# Patient Record
Sex: FEMALE | Race: WHITE | NOT HISPANIC OR LATINO | Employment: OTHER | URBAN - NONMETROPOLITAN AREA
[De-identification: names, ages, dates, MRNs, and addresses within clinical notes are randomized per-mention and may not be internally consistent; named-entity substitution may affect disease eponyms.]

---

## 2021-10-11 PROBLEM — Z86.0101 HISTORY OF ADENOMATOUS POLYP OF COLON: Status: ACTIVE | Noted: 2021-10-11

## 2024-01-09 ENCOUNTER — HOSPITAL ENCOUNTER (OUTPATIENT)
Dept: CARDIOLOGY | Facility: HOSPITAL | Age: 79
Discharge: HOME | End: 2024-01-09
Payer: MEDICARE

## 2024-01-09 DIAGNOSIS — R00.2 PALPITATIONS: ICD-10-CM

## 2024-01-09 DIAGNOSIS — R06.09 OTHER FORMS OF DYSPNEA: ICD-10-CM

## 2024-01-09 PROCEDURE — 93246 EXT ECG>7D<15D RECORDING: CPT

## 2024-01-09 PROCEDURE — 93306 TTE W/DOPPLER COMPLETE: CPT

## 2024-01-11 LAB
BH CV ECHO MEAS - AO MAX PG: 6.2 MMHG
BH CV ECHO MEAS - AO MEAN PG: 4 MMHG
BH CV ECHO MEAS - AO ROOT DIAM: 2.7 CM
BH CV ECHO MEAS - AO V2 MAX: 124 CM/SEC
BH CV ECHO MEAS - AO V2 VTI: 29.5 CM
BH CV ECHO MEAS - EDV(CUBED): 32.8 ML
BH CV ECHO MEAS - EDV(MOD-SP2): 46 ML
BH CV ECHO MEAS - EDV(MOD-SP4): 67 ML
BH CV ECHO MEAS - EF(MOD-BP): 59 %
BH CV ECHO MEAS - EF(MOD-SP2): 60.9 %
BH CV ECHO MEAS - EF(MOD-SP4): 55.2 %
BH CV ECHO MEAS - ESV(CUBED): 8 ML
BH CV ECHO MEAS - ESV(MOD-SP2): 18 ML
BH CV ECHO MEAS - ESV(MOD-SP4): 30 ML
BH CV ECHO MEAS - FS: 37.5 %
BH CV ECHO MEAS - IVS/LVPW: 1 CM
BH CV ECHO MEAS - IVSD: 1.1 CM
BH CV ECHO MEAS - LA DIMENSION: 4.5 CM
BH CV ECHO MEAS - LV DIASTOLIC VOL/BSA (35-75): 34.1 CM2
BH CV ECHO MEAS - LV MASS(C)D: 104.3 GRAMS
BH CV ECHO MEAS - LV SYSTOLIC VOL/BSA (12-30): 15.3 CM2
BH CV ECHO MEAS - LVIDD: 3.2 CM
BH CV ECHO MEAS - LVIDS: 2 CM
BH CV ECHO MEAS - LVOT AREA: 2.01 CM2
BH CV ECHO MEAS - LVOT DIAM: 1.6 CM
BH CV ECHO MEAS - LVPWD: 1.1 CM
BH CV ECHO MEAS - MR MAX PG: 67.9 MMHG
BH CV ECHO MEAS - MR MAX VEL: 412 CM/SEC
BH CV ECHO MEAS - MV A MAX VEL: 121 CM/SEC
BH CV ECHO MEAS - MV DEC TIME: 0.14 SEC
BH CV ECHO MEAS - MV E MAX VEL: 112 CM/SEC
BH CV ECHO MEAS - MV E/A: 0.93
BH CV ECHO MEAS - PI END-D VEL: 177 CM/SEC
BH CV ECHO MEAS - SI(MOD-SP2): 14.3 ML/M2
BH CV ECHO MEAS - SI(MOD-SP4): 18.9 ML/M2
BH CV ECHO MEAS - SV(MOD-SP2): 28 ML
BH CV ECHO MEAS - SV(MOD-SP4): 37 ML
BH CV ECHO MEAS - TR MAX PG: 18.7 MMHG
BH CV ECHO MEAS - TR MAX VEL: 216 CM/SEC
LEFT ATRIUM VOLUME INDEX: 29.6 ML/M2
LEFT ATRIUM VOLUME: 58 ML

## 2024-05-14 ENCOUNTER — OFFICE VISIT (OUTPATIENT)
Dept: CARDIOLOGY | Facility: CLINIC | Age: 79
End: 2024-05-14
Payer: MEDICARE

## 2024-05-14 VITALS
HEIGHT: 63 IN | DIASTOLIC BLOOD PRESSURE: 80 MMHG | BODY MASS INDEX: 35.26 KG/M2 | WEIGHT: 199 LBS | SYSTOLIC BLOOD PRESSURE: 138 MMHG | HEART RATE: 81 BPM | OXYGEN SATURATION: 94 %

## 2024-05-14 DIAGNOSIS — R00.2 PALPITATIONS: Primary | ICD-10-CM

## 2024-05-14 DIAGNOSIS — T45.515A WARFARIN-INDUCED COAGULOPATHY: ICD-10-CM

## 2024-05-14 DIAGNOSIS — Z79.01 CURRENT USE OF LONG TERM ANTICOAGULATION: ICD-10-CM

## 2024-05-14 DIAGNOSIS — I48.0 PAROXYSMAL ATRIAL FIBRILLATION: Primary | ICD-10-CM

## 2024-05-14 DIAGNOSIS — D68.32 WARFARIN-INDUCED COAGULOPATHY: ICD-10-CM

## 2024-05-14 RX ORDER — WARFARIN SODIUM 5 MG/1
5 TABLET ORAL
Qty: 60 TABLET | Refills: 11 | Status: SHIPPED | OUTPATIENT
Start: 2024-05-14

## 2024-05-14 NOTE — PATIENT INSTRUCTIONS
Repeat a holter monitor for 2 weeks  Start warfarin and schedule follow up in the anticoagulation clinic  You will need a lab draw in 1 week  Take both the eliquis and warfarin together for 7 days then STOP eliquis  No additional medication changes for now

## 2024-05-15 NOTE — PROGRESS NOTES
"EP NEW PATIENT VISIT    Chief Complaint  Rapid Heart Rate (/NEW PATIENT )    Subjective        History of Present Illness    EP Problems:  1.  Palpitations  2.  ?PAF    Cardiology Problems:  1.  Hypertension  2.  Hyperlipidemia  3.  DVT    Medical Problems:  1.  GERD  2.  Obesity  3.  CKD  4.  Type 2 diabetes  5.  Carcinoid tumor      Kiesha Nichols is a 78 y.o. female with problem list as above who presents to the clinic for evaluation of palpitations.  She has a questionable history of previous atrial fibrillation as documented on outside hospital note.  It is unclear whether this diagnosis was ever confirmed or just suspected.  She has episodic palpitations that can occur once every several weeks.  She wore a Holter monitor which revealed episodes of paroxysmal SVT but no sustained arrhythmias.  She was previously anticoagulated for history of DVT with apixaban.  She states that when her episodes of palpitations occur, they can last for several hours to even days in duration with variably fluctuating heart rates.  She feels tired and short of breath when she has these episodes.    Objective   Vital Signs:  /80 (BP Location: Right arm, Patient Position: Sitting)   Pulse 81   Ht 160 cm (63\")   Wt 90.3 kg (199 lb)   SpO2 94%   BMI 35.25 kg/m²   Estimated body mass index is 35.25 kg/m² as calculated from the following:    Height as of this encounter: 160 cm (63\").    Weight as of this encounter: 90.3 kg (199 lb).      Physical Exam  Vitals reviewed.   Constitutional:       Appearance: She is obese.   Cardiovascular:      Rate and Rhythm: Normal rate and regular rhythm.      Pulses: Normal pulses.      Heart sounds: Normal heart sounds. No murmur heard.  Pulmonary:      Effort: Pulmonary effort is normal. No respiratory distress.      Breath sounds: Normal breath sounds.   Skin:     General: Skin is warm and dry.   Neurological:      General: No focal deficit present.      Mental Status: She is alert and " oriented to person, place, and time.   Psychiatric:         Mood and Affect: Mood normal.         Judgment: Judgment normal.        Result Review :  The following data was reviewed by: Koki Hanson MD on 05/14/2024:    Previous Holter monitor from 1/9/2024 directly visualized) reviewed: Predominantly sinus rhythm, paroxysmal episodes of SVT.  No evidence of significant atrial fibrillation or atrial flutter.          [id number] SCORE   [id number] Score: 5 (5/15/2024  1:06 AM)                 Assessment and Plan   Diagnoses and all orders for this visit:    1. Palpitations (Primary)  -     Holter Monitor - 72 Hour Up To 15 Days; Future    2. Warfarin-induced coagulopathy  -     Cancel: Protime-INR; Future    Other orders  -     warfarin (COUMADIN) 5 MG tablet; Take 1 tablet by mouth Daily.  Dispense: 60 tablet; Refill: 11        Kiesha Nichols is a 78 y.o. female with problem list as above who presents to the clinic for evaluation of palpitations, questionable paroxysmal atrial fibrillation, previous DVT.  In regards to her symptomatic episodes, they sound most suspicious for paroxysmal atrial fibrillation.  I suspect that this is her most likely diagnosis.  Given that we have not captured 1 of these episodes on Holter monitoring, we will repeat a 2-week Holter monitor today.    She is already on anticoagulation but unable to afford apixaban.  Will plan to transition her to warfarin for her history of DVT as well as possible atrial fibrillation.  She will use apixaban as a bridge during the initiation.    She would benefit from weight loss and lifestyle risk factor modification for prevention of recurrent atrial fibrillation.    Plan:  -Repeat a 2-week Holter monitor to try to capture her symptomatic episodes  -Stop apixaban and start warfarin for anticoagulation given inability to afford apixaban  -Diet, weight loss, exercise for prevention of atrial fibrillation episodes  -Can consider additional interventions  for atrial fibrillation once arrhythmias are documented             Follow Up   Return in about 3 months (around 8/14/2024).  Patient was given instructions and counseling regarding her condition or for health maintenance advice. Please see specific information pulled into the AVS if appropriate.     Part of this note may be an electronic transcription/translation of spoken language to printed text using the Dragon Dictation System.

## 2024-05-16 ENCOUNTER — ANTICOAGULATION VISIT (OUTPATIENT)
Dept: CARDIOLOGY | Facility: CLINIC | Age: 79
End: 2024-05-16
Payer: MEDICARE

## 2024-05-16 DIAGNOSIS — Z79.01 CURRENT USE OF LONG TERM ANTICOAGULATION: ICD-10-CM

## 2024-05-16 DIAGNOSIS — I48.0 PAROXYSMAL ATRIAL FIBRILLATION: Primary | ICD-10-CM

## 2024-05-17 ENCOUNTER — ANTICOAGULATION VISIT (OUTPATIENT)
Dept: CARDIOLOGY | Facility: CLINIC | Age: 79
End: 2024-05-17
Payer: MEDICARE

## 2024-05-17 DIAGNOSIS — Z79.01 CURRENT USE OF LONG TERM ANTICOAGULATION: ICD-10-CM

## 2024-05-17 DIAGNOSIS — I48.0 PAROXYSMAL ATRIAL FIBRILLATION: Primary | ICD-10-CM

## 2024-05-21 LAB — INR PPP: 3.8

## 2024-05-22 ENCOUNTER — ANTICOAGULATION VISIT (OUTPATIENT)
Dept: CARDIOLOGY | Facility: CLINIC | Age: 79
End: 2024-05-22
Payer: MEDICARE

## 2024-05-22 DIAGNOSIS — I48.0 PAROXYSMAL ATRIAL FIBRILLATION: Primary | ICD-10-CM

## 2024-05-22 DIAGNOSIS — Z79.01 CURRENT USE OF LONG TERM ANTICOAGULATION: ICD-10-CM

## 2024-05-28 ENCOUNTER — TELEPHONE (OUTPATIENT)
Dept: CARDIOLOGY | Facility: CLINIC | Age: 79
End: 2024-05-28
Payer: MEDICARE

## 2024-05-28 ENCOUNTER — ANTICOAGULATION VISIT (OUTPATIENT)
Dept: CARDIOLOGY | Facility: CLINIC | Age: 79
End: 2024-05-28
Payer: MEDICARE

## 2024-05-28 DIAGNOSIS — I48.0 PAROXYSMAL ATRIAL FIBRILLATION: Primary | ICD-10-CM

## 2024-05-28 DIAGNOSIS — Z79.01 CURRENT USE OF LONG TERM ANTICOAGULATION: ICD-10-CM

## 2024-05-28 LAB — INR PPP: 4.8

## 2024-05-28 NOTE — PROGRESS NOTES
Messaged received from clinic office for critical INR that was not called or transferred to the Coumadin clinic. RN placed call to [facility] outpatient lab, spoke with Lynnette who apologized and has faxed the results to coumadin clinic. She will notify staff that INR results are to be called to the Coumadin clinic. Unable to reach patient for instructions or leave a voicemail as VM is not set up. No one is listed on Verbal Release of Information or emergency contacts

## 2024-06-03 ENCOUNTER — TELEPHONE (OUTPATIENT)
Dept: CARDIOLOGY | Facility: CLINIC | Age: 79
End: 2024-06-03
Payer: MEDICARE

## 2024-06-03 ENCOUNTER — ANTICOAGULATION VISIT (OUTPATIENT)
Dept: CARDIOLOGY | Facility: CLINIC | Age: 79
End: 2024-06-03
Payer: MEDICARE

## 2024-06-03 DIAGNOSIS — I48.0 PAROXYSMAL ATRIAL FIBRILLATION: Primary | ICD-10-CM

## 2024-06-03 DIAGNOSIS — Z79.01 CURRENT USE OF LONG TERM ANTICOAGULATION: ICD-10-CM

## 2024-06-03 LAB — INR PPP: 1.6

## 2024-06-10 ENCOUNTER — TELEPHONE (OUTPATIENT)
Dept: CARDIOLOGY | Facility: CLINIC | Age: 79
End: 2024-06-10
Payer: MEDICARE

## 2024-06-10 ENCOUNTER — ANTICOAGULATION VISIT (OUTPATIENT)
Dept: CARDIOLOGY | Facility: CLINIC | Age: 79
End: 2024-06-10
Payer: MEDICARE

## 2024-06-10 DIAGNOSIS — I48.0 PAROXYSMAL ATRIAL FIBRILLATION: Primary | ICD-10-CM

## 2024-06-10 DIAGNOSIS — Z79.01 CURRENT USE OF LONG TERM ANTICOAGULATION: ICD-10-CM

## 2024-06-10 NOTE — TELEPHONE ENCOUNTER
Koki Hanson MD Cooper, Shelby R, MA  Please let her know that her monitor did in fact confirm atrial fibrillation as we suspected in clinic.  She should continue on her blood thinners.  If she is okay with it, we can start her on flecainide to try to suppress some of these episodes.  Long-term, ablation may be reasonable and we can discuss this at her next clinic visit.  She should let us know if she has any prolonged episodes with this or if her symptoms get worse.      Patient spouse Ameya notified, stating he would like to wait until her follow up appt before starting on any medications. He states she has been feeling better. He will call if she begins to have symptoms or her symptoms worsen.

## 2024-06-11 LAB — INR PPP: 4.6

## 2024-06-13 ENCOUNTER — ANTICOAGULATION VISIT (OUTPATIENT)
Dept: CARDIOLOGY | Facility: CLINIC | Age: 79
End: 2024-06-13
Payer: MEDICARE

## 2024-06-13 DIAGNOSIS — I48.0 PAROXYSMAL ATRIAL FIBRILLATION: Primary | ICD-10-CM

## 2024-06-13 DIAGNOSIS — Z79.01 CURRENT USE OF LONG TERM ANTICOAGULATION: ICD-10-CM

## 2024-06-13 LAB — INR PPP: 2.8

## 2024-06-18 ENCOUNTER — ANTICOAGULATION VISIT (OUTPATIENT)
Dept: CARDIOLOGY | Facility: CLINIC | Age: 79
End: 2024-06-18
Payer: MEDICARE

## 2024-06-18 DIAGNOSIS — I48.0 PAROXYSMAL ATRIAL FIBRILLATION: Primary | ICD-10-CM

## 2024-06-18 DIAGNOSIS — Z79.01 CURRENT USE OF LONG TERM ANTICOAGULATION: ICD-10-CM

## 2024-06-18 LAB — INR PPP: 1.8

## 2024-06-27 ENCOUNTER — ANTICOAGULATION VISIT (OUTPATIENT)
Dept: CARDIOLOGY | Facility: CLINIC | Age: 79
End: 2024-06-27
Payer: MEDICARE

## 2024-06-27 DIAGNOSIS — I48.0 PAROXYSMAL ATRIAL FIBRILLATION: Primary | ICD-10-CM

## 2024-06-27 DIAGNOSIS — Z79.01 CURRENT USE OF LONG TERM ANTICOAGULATION: ICD-10-CM

## 2024-06-27 LAB — INR PPP: 2.1

## 2024-07-11 ENCOUNTER — ANTICOAGULATION VISIT (OUTPATIENT)
Dept: CARDIOLOGY | Facility: CLINIC | Age: 79
End: 2024-07-11
Payer: MEDICARE

## 2024-07-11 DIAGNOSIS — I48.0 PAROXYSMAL ATRIAL FIBRILLATION: Primary | ICD-10-CM

## 2024-07-11 DIAGNOSIS — Z79.01 CURRENT USE OF LONG TERM ANTICOAGULATION: ICD-10-CM

## 2024-07-11 LAB — INR PPP: 1.8

## 2024-07-25 ENCOUNTER — ANTICOAGULATION VISIT (OUTPATIENT)
Dept: CARDIOLOGY | Facility: CLINIC | Age: 79
End: 2024-07-25
Payer: MEDICARE

## 2024-07-25 DIAGNOSIS — Z79.01 CURRENT USE OF LONG TERM ANTICOAGULATION: ICD-10-CM

## 2024-07-25 DIAGNOSIS — I48.0 PAROXYSMAL ATRIAL FIBRILLATION: Primary | ICD-10-CM

## 2024-07-25 LAB — INR PPP: 1.9

## 2024-08-01 ENCOUNTER — ANTICOAGULATION VISIT (OUTPATIENT)
Dept: CARDIOLOGY | Facility: CLINIC | Age: 79
End: 2024-08-01
Payer: MEDICARE

## 2024-08-01 DIAGNOSIS — Z79.01 CURRENT USE OF LONG TERM ANTICOAGULATION: ICD-10-CM

## 2024-08-01 DIAGNOSIS — I48.0 PAROXYSMAL ATRIAL FIBRILLATION: Primary | ICD-10-CM

## 2024-08-01 LAB — INR PPP: 2.1

## 2024-08-15 ENCOUNTER — ANTICOAGULATION VISIT (OUTPATIENT)
Dept: CARDIOLOGY | Facility: CLINIC | Age: 79
End: 2024-08-15
Payer: MEDICARE

## 2024-08-15 DIAGNOSIS — I48.0 PAROXYSMAL ATRIAL FIBRILLATION: Primary | ICD-10-CM

## 2024-08-15 DIAGNOSIS — Z79.01 CURRENT USE OF LONG TERM ANTICOAGULATION: ICD-10-CM

## 2024-08-15 LAB — INR PPP: 4.7

## 2024-08-16 ENCOUNTER — ANTICOAGULATION VISIT (OUTPATIENT)
Dept: CARDIOLOGY | Facility: CLINIC | Age: 79
End: 2024-08-16
Payer: MEDICARE

## 2024-08-16 ENCOUNTER — OFFICE VISIT (OUTPATIENT)
Dept: CARDIOLOGY | Facility: CLINIC | Age: 79
End: 2024-08-16
Payer: MEDICARE

## 2024-08-16 ENCOUNTER — LAB (OUTPATIENT)
Dept: LAB | Facility: HOSPITAL | Age: 79
End: 2024-08-16
Payer: MEDICARE

## 2024-08-16 VITALS
OXYGEN SATURATION: 99 % | DIASTOLIC BLOOD PRESSURE: 84 MMHG | HEART RATE: 84 BPM | HEIGHT: 63 IN | SYSTOLIC BLOOD PRESSURE: 158 MMHG | BODY MASS INDEX: 33.84 KG/M2 | WEIGHT: 191 LBS

## 2024-08-16 DIAGNOSIS — I48.0 PAROXYSMAL ATRIAL FIBRILLATION: ICD-10-CM

## 2024-08-16 DIAGNOSIS — I48.91 ATRIAL FIBRILLATION, UNSPECIFIED TYPE: Primary | ICD-10-CM

## 2024-08-16 DIAGNOSIS — Z79.01 CURRENT USE OF LONG TERM ANTICOAGULATION: ICD-10-CM

## 2024-08-16 DIAGNOSIS — I48.0 PAROXYSMAL ATRIAL FIBRILLATION: Primary | ICD-10-CM

## 2024-08-16 LAB
INR PPP: 3.49 (ref 0.91–1.09)
PROTHROMBIN TIME: 36.4 SECONDS (ref 11.8–14.8)

## 2024-08-16 PROCEDURE — 36415 COLL VENOUS BLD VENIPUNCTURE: CPT

## 2024-08-16 PROCEDURE — 85610 PROTHROMBIN TIME: CPT

## 2024-08-16 RX ORDER — FLECAINIDE ACETATE 50 MG/1
50 TABLET ORAL 2 TIMES DAILY
Qty: 60 TABLET | Refills: 11 | Status: SHIPPED | OUTPATIENT
Start: 2024-08-16

## 2024-08-16 NOTE — PATIENT INSTRUCTIONS
Start flecainide 50mg twice a day.   Get EKG in one week at the heart center.     Follow up in EP clinic in 1 month

## 2024-08-16 NOTE — PROGRESS NOTES
"[facility] -  CLINIC FOLLOW UP     Patient Care Team:  Sky Haro MD as PCP - General  Sky Haro MD as PCP - Family Medicine  Alessio Hendrix MD as Consulting Physician (Gastroenterology)  Koki Hanson MD as Cardiologist (Cardiac Electrophysiology)    Chief Complaint:follow up to monitor     Subjective   EP Problems:  1.  Palpitations  2.  PAF  -6/7/24: Holter with afib      Cardiology Problems:  1.  Hypertension  2.  Hyperlipidemia  3.  DVT     Medical Problems:  1.  GERD  2.  Obesity  3.  CKD  4.  Type 2 diabetes  5.  Carcinoid tumor    HPI: Today I had the pleasure of seeing Kiesha Nichols in the cardiology clinic for follow up. She is a pleasant 78 year old female with a history of HTN, DM, DVT and palpitations now confirmed to be PAF on recent holter monitoring. She was symptomatic during her episodes with palpitations. EKG today has NSR with QRS duration of 68ms. Her echo has normal LVEF without any structural abnormalities.     She walks every morning and denies any symptoms of angina. She likes to walk on her porch which is 60 ft long. She denies any presyncope or dizziness and is not prone to falls. However, her  is concerned about her recent INRS being low and then too high over 4. She denies any bleeding issues. However, they are interested in the Watchman since they could not afford eliquis or other DOACs and seem to be having issues with Warfarin therapeutics.     Objective     Visit Vitals  /84   Pulse 84   Ht 160 cm (62.99\")   Wt 86.6 kg (191 lb)   SpO2 99%   BMI 33.84 kg/m²           Vitals reviewed.   Constitutional:       Appearance: Not in distress.   Eyes:      Extraocular Movements: Extraocular movements intact.      Conjunctiva/sclera: Conjunctivae normal.      Pupils: Pupils are equal, round, and reactive to light.   HENT:      Head: Normocephalic and atraumatic.      Nose: Nose normal.    Mouth/Throat:      Lips: Pink.      " Mouth: Mucous membranes are moist.      Pharynx: Oropharynx is clear.   Neck:      Vascular: No carotid bruit or JVD. JVD normal.   Pulmonary:      Effort: Pulmonary effort is normal.      Breath sounds: Normal breath sounds.   Chest:      Chest wall: Not tender to palpatation.   Cardiovascular:      PMI at left midclavicular line. Normal rate. Occasional ectopic beats. Regular rhythm. Normal S1. Normal S2.       Murmurs: There is no murmur.      No gallop.  No rub.   Pulses:     Radial: 2+ bilaterally.  Edema:     Peripheral edema absent.   Musculoskeletal:      Extremities: No clubbing present.     Cervical back: Normal range of motion. Skin:     General: Skin is warm and dry.   Neurological:      General: No focal deficit present.      Mental Status: Alert.   Psychiatric:         Attention and Perception: Attention normal.         Mood and Affect: Affect normal.         Speech: Speech normal.         Behavior: Behavior normal.             The following portions of the patient's history were reviewed and updated as appropriate: allergies, current medications, past medical history, past social history, past and problem list.     Review of Systems   Constitutional: Negative.    HENT: Negative.     Eyes: Negative.    Respiratory: Negative.     Cardiovascular:  Positive for palpitations.   Gastrointestinal: Negative.    Endocrine: Negative.    Genitourinary: Negative.    Musculoskeletal: Negative.    Skin: Negative.    Allergic/Immunologic: Negative.    Neurological: Negative.    Hematological: Negative.    Psychiatric/Behavioral: Negative.              ECG 12 Lead    Date/Time: 8/16/2024 10:36 AM  Performed by: Dee Dee Membreno PA    Authorized by: Dee Dee Membreno PA  Comparison: compared with previous ECG from 5/17/2024  Rhythm: sinus rhythm  Ectopy: unifocal PVCs  Rate: normal  BPM: 84    Clinical impression: normal ECG          Holter Monitor - 72 Hour Up To 15 Days (05/14/2024 12:26)           Medication  Review: yes    Current Outpatient Medications:     amLODIPine (NORVASC) 5 MG tablet, Take 1 tablet by mouth Daily., Disp: , Rfl:     calcium carbonate (OS-CLOVER) 600 MG tablet, Take 1 tablet by mouth Daily., Disp: , Rfl:     Cholecalciferol (VITAMIN D3 PO), Take  by mouth., Disp: , Rfl:     Diclofenac Sodium (VOLTAREN) 1 % gel gel, Apply 2 g topically to the appropriate area as directed As Needed., Disp: , Rfl:     esomeprazole (nexIUM) 20 MG capsule, Take 1 capsule by mouth As Needed., Disp: , Rfl:     insulin aspart (novoLOG) 100 UNIT/ML injection, Inject  under the skin into the appropriate area as directed 3 (Three) Times a Day Before Meals., Disp: , Rfl:     Insulin Degludec (TRESIBA FLEXTOUCH) 200 UNIT/ML solution pen-injector pen injection, Inject  under the skin into the appropriate area as directed Daily., Disp: , Rfl:     irbesartan (Avapro) 300 MG tablet, Take 1 tablet by mouth Every Night., Disp: , Rfl:     lidocaine (LIDODERM) 5 %, Place 1 patch on the skin as directed by provider Daily. Remove & Discard patch within 12 hours or as directed by MD, Disp: 30 each, Rfl: 0    losartan (COZAAR) 50 MG tablet, Take 1 tablet by mouth 2 (Two) Times a Day., Disp: , Rfl:     metoprolol tartrate (LOPRESSOR) 50 MG tablet, Take 1 tablet by mouth., Disp: , Rfl:     multivitamin with minerals tablet tablet, Take 1 tablet by mouth Daily., Disp: , Rfl:     NIFEdipine XL (PROCARDIA XL) 60 MG 24 hr tablet, Take 1 tablet by mouth Daily., Disp: , Rfl:     spironolactone (ALDACTONE) 50 MG tablet, Take 1 tablet by mouth Daily., Disp: , Rfl:     triamcinolone (KENALOG) 0.1 % lotion, Apply  topically to the appropriate area as directed 3 (Three) Times a Day., Disp: , Rfl:     warfarin (COUMADIN) 5 MG tablet, Take 1 tablet by mouth Daily., Disp: 60 tablet, Rfl: 11    flecainide (TAMBOCOR) 50 MG tablet, Take 1 tablet by mouth 2 (Two) Times a Day., Disp: 60 tablet, Rfl: 11   Allergies   Allergen Reactions    Augmentin  [Amoxicillin-Pot Clavulanate] Other (See Comments)     unknown    Crestor [Rosuvastatin] Other (See Comments)     unknown    Lipitor [Atorvastatin] Other (See Comments)     unknown    Niaspan [Niacin] Diarrhea    Prednisone Other (See Comments)     All oral steroids    Sulfa Antibiotics Other (See Comments)     unknown    Zocor [Simvastatin] Other (See Comments)     unknown       I have reviewed       Lab Results   Component Value Date    GLUCOSE 97 04/29/2021    CALCIUM 9.7 04/29/2021     04/29/2021    K 4.7 04/29/2021    CO2 25.6 04/29/2021     04/29/2021    BUN 30 (H) 04/29/2021    CREATININE 1.34 (H) 04/29/2021    BCR 22.4 04/29/2021    ANIONGAP 10.4 04/29/2021         Results for orders placed during the hospital encounter of 01/09/24    Adult Transthoracic Echo Complete W/ Cont if Necessary Per Protocol    Interpretation Summary    Left ventricular systolic function is normal. Left ventricular ejection fraction appears to be 56 - 60%.    Left ventricular diastolic dysfunction is noted.    Normal right ventricular cavity size and systolic function noted.    Mild mitral annular calcification is present. Mild mitral valve regurgitation is present.    No evidence of pulmonary hypertension is present.     Assessment:   Diagnoses and all orders for this visit:    1. Atrial fibrillation, unspecified type (Primary)  -     ECG 12 Lead; Future  -     flecainide (TAMBOCOR) 50 MG tablet; Take 1 tablet by mouth 2 (Two) Times a Day.  Dispense: 60 tablet; Refill: 11  -     ECG 12 Lead; Future    2. Paroxysmal atrial fibrillation    3. Current use of long term anticoagulation    Other orders  -     ECG 12 Lead    PAF: Reviewed holter with patient and . Currently she is symptomatic with palpitations and discussed that it's reasonable to try Flecainide given her normal echo and no symptoms of angina.   -QRS today on EKG is 68ms. Will start Flecainide 50mg BID   -Obtain EKG in 3-5 days   -Follow up in 1 month      Anticoagulation: Currently on warfarin due to financial strain with DOACs. However, her INRs have been a bit labile. Discussed Nolberto briefly.     I spent 30 minutes caring for Kiesha on this date of service. This time includes time spent by me in the following activities:preparing for the visit, reviewing tests, obtaining and/or reviewing a separately obtained history, performing a medically appropriate examination and/or evaluation , counseling and educating the patient/family/caregiver, ordering medications, tests, or procedures, referring and communicating with other health care professionals , documenting information in the medical record, and independently interpreting results and communicating that information with the patient/family/caregiver        Electronically signed by MARY Galvin

## 2024-08-22 ENCOUNTER — TELEPHONE (OUTPATIENT)
Dept: CARDIOLOGY | Facility: CLINIC | Age: 79
End: 2024-08-22
Payer: MEDICARE

## 2024-08-22 ENCOUNTER — ANTICOAGULATION VISIT (OUTPATIENT)
Dept: CARDIOLOGY | Facility: CLINIC | Age: 79
End: 2024-08-22
Payer: MEDICARE

## 2024-08-22 DIAGNOSIS — Z79.01 CURRENT USE OF LONG TERM ANTICOAGULATION: ICD-10-CM

## 2024-08-22 DIAGNOSIS — I48.0 PAROXYSMAL ATRIAL FIBRILLATION: Primary | ICD-10-CM

## 2024-08-22 LAB — INR PPP: 3.7

## 2024-08-22 NOTE — TELEPHONE ENCOUNTER
Caller: JACOB BAEZA    Relationship: Emergency Contact    Best call back number: [phone]     Caller requesting test results: JACOB BAEZA    What test was performed: INR    When was the test performed: 08.16.24

## 2024-08-23 ENCOUNTER — HOSPITAL ENCOUNTER (OUTPATIENT)
Dept: CARDIOLOGY | Facility: HOSPITAL | Age: 79
Discharge: HOME | End: 2024-08-23
Payer: MEDICARE

## 2024-08-23 DIAGNOSIS — I48.91 ATRIAL FIBRILLATION, UNSPECIFIED TYPE: ICD-10-CM

## 2024-08-23 LAB
QT INTERVAL: 400 MS
QTC INTERVAL: 452 MS

## 2024-08-23 PROCEDURE — 93005 ELECTROCARDIOGRAM TRACING: CPT | Performed by: PHYSICIAN ASSISTANT

## 2024-08-23 NOTE — TELEPHONE ENCOUNTER
Contacted Mr. Nichols on Thursday, 8/22, with INR result for patient and instructions related to dosing and repeat INR testing.  He verbalized understanding.    HV

## 2024-08-29 ENCOUNTER — ANTICOAGULATION VISIT (OUTPATIENT)
Dept: CARDIOLOGY | Facility: CLINIC | Age: 79
End: 2024-08-29
Payer: MEDICARE

## 2024-08-29 DIAGNOSIS — I48.0 PAROXYSMAL ATRIAL FIBRILLATION: Primary | ICD-10-CM

## 2024-08-29 DIAGNOSIS — Z79.01 CURRENT USE OF LONG TERM ANTICOAGULATION: ICD-10-CM

## 2024-08-29 LAB
INR PPP: 2.5
QT INTERVAL: 400 MS
QTC INTERVAL: 452 MS

## 2024-09-05 ENCOUNTER — ANTICOAGULATION VISIT (OUTPATIENT)
Dept: CARDIOLOGY | Facility: CLINIC | Age: 79
End: 2024-09-05
Payer: MEDICARE

## 2024-09-05 DIAGNOSIS — I48.0 PAROXYSMAL ATRIAL FIBRILLATION: Primary | ICD-10-CM

## 2024-09-05 DIAGNOSIS — Z79.01 CURRENT USE OF LONG TERM ANTICOAGULATION: ICD-10-CM

## 2024-09-05 LAB — INR PPP: 2.7

## 2024-09-19 ENCOUNTER — ANTICOAGULATION VISIT (OUTPATIENT)
Dept: CARDIOLOGY | Facility: CLINIC | Age: 79
End: 2024-09-19
Payer: MEDICARE

## 2024-09-19 DIAGNOSIS — Z79.01 CURRENT USE OF LONG TERM ANTICOAGULATION: ICD-10-CM

## 2024-09-19 DIAGNOSIS — I48.0 PAROXYSMAL ATRIAL FIBRILLATION: Primary | ICD-10-CM

## 2024-09-19 LAB — INR PPP: 2.7

## 2024-09-27 ENCOUNTER — LAB (OUTPATIENT)
Dept: LAB | Facility: HOSPITAL | Age: 79
End: 2024-09-27
Payer: MEDICARE

## 2024-09-27 ENCOUNTER — OFFICE VISIT (OUTPATIENT)
Dept: CARDIOLOGY | Facility: CLINIC | Age: 79
End: 2024-09-27
Payer: MEDICARE

## 2024-09-27 VITALS
HEART RATE: 75 BPM | BODY MASS INDEX: 33.84 KG/M2 | DIASTOLIC BLOOD PRESSURE: 78 MMHG | HEIGHT: 63 IN | SYSTOLIC BLOOD PRESSURE: 146 MMHG | WEIGHT: 191 LBS | OXYGEN SATURATION: 98 %

## 2024-09-27 DIAGNOSIS — I48.0 PAROXYSMAL ATRIAL FIBRILLATION: Primary | ICD-10-CM

## 2024-09-27 DIAGNOSIS — I48.0 PAROXYSMAL ATRIAL FIBRILLATION: ICD-10-CM

## 2024-09-27 LAB
ANION GAP SERPL CALCULATED.3IONS-SCNC: 10 MMOL/L (ref 5–15)
BUN SERPL-MCNC: 28 MG/DL (ref 8–23)
BUN/CREAT SERPL: 21.2 (ref 7–25)
CALCIUM SPEC-SCNC: 9.1 MG/DL (ref 8.6–10.5)
CHLORIDE SERPL-SCNC: 107 MMOL/L (ref 98–107)
CO2 SERPL-SCNC: 27 MMOL/L (ref 22–29)
CREAT SERPL-MCNC: 1.32 MG/DL (ref 0.57–1)
EGFRCR SERPLBLD CKD-EPI 2021: 40.9 ML/MIN/1.73
GLUCOSE SERPL-MCNC: 195 MG/DL (ref 65–99)
POTASSIUM SERPL-SCNC: 4.4 MMOL/L (ref 3.5–5.2)
SODIUM SERPL-SCNC: 144 MMOL/L (ref 136–145)

## 2024-09-27 PROCEDURE — 80048 BASIC METABOLIC PNL TOTAL CA: CPT

## 2024-09-27 PROCEDURE — 93000 ELECTROCARDIOGRAM COMPLETE: CPT | Performed by: PHYSICIAN ASSISTANT

## 2024-09-27 PROCEDURE — 36415 COLL VENOUS BLD VENIPUNCTURE: CPT

## 2024-09-27 PROCEDURE — 99214 OFFICE O/P EST MOD 30 MIN: CPT | Performed by: PHYSICIAN ASSISTANT

## 2024-10-17 ENCOUNTER — ANTICOAGULATION VISIT (OUTPATIENT)
Dept: CARDIOLOGY | Facility: CLINIC | Age: 79
End: 2024-10-17
Payer: MEDICARE

## 2024-10-17 DIAGNOSIS — Z79.01 CURRENT USE OF LONG TERM ANTICOAGULATION: ICD-10-CM

## 2024-10-17 DIAGNOSIS — I48.0 PAROXYSMAL ATRIAL FIBRILLATION: Primary | ICD-10-CM

## 2024-10-17 LAB — INR PPP: 2.5

## 2024-10-21 ENCOUNTER — APPOINTMENT (OUTPATIENT)
Dept: GENERAL RADIOLOGY | Facility: HOSPITAL | Age: 79
End: 2024-10-21
Payer: MEDICARE

## 2024-10-21 ENCOUNTER — HOSPITAL ENCOUNTER (INPATIENT)
Facility: HOSPITAL | Age: 79
LOS: 1 days | Discharge: HOME | End: 2024-10-22
Attending: FAMILY MEDICINE | Admitting: FAMILY MEDICINE
Payer: MEDICARE

## 2024-10-21 DIAGNOSIS — I48.91 ATRIAL FIBRILLATION WITH RVR: Primary | ICD-10-CM

## 2024-10-21 DIAGNOSIS — I48.91 ATRIAL FIBRILLATION, UNSPECIFIED TYPE: ICD-10-CM

## 2024-10-21 DIAGNOSIS — R06.02 SHORTNESS OF BREATH: ICD-10-CM

## 2024-10-21 PROBLEM — E11.9 TYPE 2 DIABETES MELLITUS: Status: ACTIVE | Noted: 2024-10-21

## 2024-10-21 PROBLEM — R79.1 SUBTHERAPEUTIC INTERNATIONAL NORMALIZED RATIO (INR): Status: ACTIVE | Noted: 2024-10-21

## 2024-10-21 PROBLEM — E66.811 CLASS 1 OBESITY WITH BODY MASS INDEX (BMI) OF 32.0 TO 32.9 IN ADULT: Status: ACTIVE | Noted: 2024-10-21

## 2024-10-21 PROBLEM — Z79.01 CHRONIC ANTICOAGULATION: Status: ACTIVE | Noted: 2024-05-14

## 2024-10-21 LAB
ANION GAP SERPL CALCULATED.3IONS-SCNC: 10 MMOL/L (ref 5–15)
APTT PPP: 39.9 SECONDS (ref 24.5–36)
BASOPHILS # BLD AUTO: 0.06 10*3/MM3 (ref 0–0.2)
BASOPHILS NFR BLD AUTO: 0.4 % (ref 0–1.5)
BUN SERPL-MCNC: 23 MG/DL (ref 8–23)
BUN/CREAT SERPL: 23.7 (ref 7–25)
CALCIUM SPEC-SCNC: 9.2 MG/DL (ref 8.6–10.5)
CHLORIDE SERPL-SCNC: 104 MMOL/L (ref 98–107)
CO2 SERPL-SCNC: 25 MMOL/L (ref 22–29)
CREAT SERPL-MCNC: 0.97 MG/DL (ref 0.57–1)
D DIMER PPP FEU-MCNC: 0.43 MCGFEU/ML (ref 0–0.8)
DEPRECATED RDW RBC AUTO: 42.3 FL (ref 37–54)
EGFRCR SERPLBLD CKD-EPI 2021: 59.2 ML/MIN/1.73
EOSINOPHIL # BLD AUTO: 0.08 10*3/MM3 (ref 0–0.4)
EOSINOPHIL NFR BLD AUTO: 0.6 % (ref 0.3–6.2)
ERYTHROCYTE [DISTWIDTH] IN BLOOD BY AUTOMATED COUNT: 12.3 % (ref 12.3–15.4)
GEN 5 2HR TROPONIN T REFLEX: 14 NG/L
GLUCOSE BLDC GLUCOMTR-MCNC: 106 MG/DL (ref 70–130)
GLUCOSE BLDC GLUCOMTR-MCNC: 383 MG/DL (ref 70–130)
GLUCOSE BLDC GLUCOMTR-MCNC: 85 MG/DL (ref 70–130)
GLUCOSE SERPL-MCNC: 111 MG/DL (ref 65–99)
HCT VFR BLD AUTO: 38.1 % (ref 34–46.6)
HGB BLD-MCNC: 12.2 G/DL (ref 12–15.9)
IMM GRANULOCYTES # BLD AUTO: 0.05 10*3/MM3 (ref 0–0.05)
IMM GRANULOCYTES NFR BLD AUTO: 0.4 % (ref 0–0.5)
INR PPP: 1.86 (ref 0.91–1.09)
LYMPHOCYTES # BLD AUTO: 0.89 10*3/MM3 (ref 0.7–3.1)
LYMPHOCYTES NFR BLD AUTO: 6.5 % (ref 19.6–45.3)
MAGNESIUM SERPL-MCNC: 1.8 MG/DL (ref 1.6–2.4)
MCH RBC QN AUTO: 29.8 PG (ref 26.6–33)
MCHC RBC AUTO-ENTMCNC: 32 G/DL (ref 31.5–35.7)
MCV RBC AUTO: 92.9 FL (ref 79–97)
MONOCYTES # BLD AUTO: 1.06 10*3/MM3 (ref 0.1–0.9)
MONOCYTES NFR BLD AUTO: 7.7 % (ref 5–12)
NEUTROPHILS NFR BLD AUTO: 11.63 10*3/MM3 (ref 1.7–7)
NEUTROPHILS NFR BLD AUTO: 84.4 % (ref 42.7–76)
NRBC BLD AUTO-RTO: 0 /100 WBC (ref 0–0.2)
NT-PROBNP SERPL-MCNC: 5003 PG/ML (ref 0–1800)
PLATELET # BLD AUTO: 234 10*3/MM3 (ref 140–450)
PMV BLD AUTO: 11.5 FL (ref 6–12)
POTASSIUM SERPL-SCNC: 4 MMOL/L (ref 3.5–5.2)
PROTHROMBIN TIME: 22.2 SECONDS (ref 11.8–14.8)
RBC # BLD AUTO: 4.1 10*6/MM3 (ref 3.77–5.28)
SODIUM SERPL-SCNC: 139 MMOL/L (ref 136–145)
TROPONIN T DELTA: -3 NG/L
TROPONIN T SERPL HS-MCNC: 17 NG/L
TSH SERPL DL<=0.05 MIU/L-ACNC: 0.98 UIU/ML (ref 0.27–4.2)
WBC NRBC COR # BLD AUTO: 13.77 10*3/MM3 (ref 3.4–10.8)

## 2024-10-21 PROCEDURE — 93005 ELECTROCARDIOGRAM TRACING: CPT | Performed by: STUDENT IN AN ORGANIZED HEALTH CARE EDUCATION/TRAINING PROGRAM

## 2024-10-21 PROCEDURE — 83735 ASSAY OF MAGNESIUM: CPT | Performed by: FAMILY MEDICINE

## 2024-10-21 PROCEDURE — 80048 BASIC METABOLIC PNL TOTAL CA: CPT | Performed by: FAMILY MEDICINE

## 2024-10-21 PROCEDURE — 93010 ELECTROCARDIOGRAM REPORT: CPT | Performed by: INTERNAL MEDICINE

## 2024-10-21 PROCEDURE — 85025 COMPLETE CBC W/AUTO DIFF WBC: CPT | Performed by: FAMILY MEDICINE

## 2024-10-21 PROCEDURE — 93005 ELECTROCARDIOGRAM TRACING: CPT | Performed by: FAMILY MEDICINE

## 2024-10-21 PROCEDURE — 82948 REAGENT STRIP/BLOOD GLUCOSE: CPT

## 2024-10-21 PROCEDURE — 83880 ASSAY OF NATRIURETIC PEPTIDE: CPT | Performed by: FAMILY MEDICINE

## 2024-10-21 PROCEDURE — 71045 X-RAY EXAM CHEST 1 VIEW: CPT

## 2024-10-21 PROCEDURE — 63710000001 INSULIN LISPRO (HUMAN) PER 5 UNITS: Performed by: NURSE PRACTITIONER

## 2024-10-21 PROCEDURE — 84443 ASSAY THYROID STIM HORMONE: CPT | Performed by: NURSE PRACTITIONER

## 2024-10-21 PROCEDURE — 85610 PROTHROMBIN TIME: CPT | Performed by: FAMILY MEDICINE

## 2024-10-21 PROCEDURE — 25010000002 MAGNESIUM SULFATE 2 GM/50ML SOLUTION: Performed by: STUDENT IN AN ORGANIZED HEALTH CARE EDUCATION/TRAINING PROGRAM

## 2024-10-21 PROCEDURE — 36415 COLL VENOUS BLD VENIPUNCTURE: CPT

## 2024-10-21 PROCEDURE — 99222 1ST HOSP IP/OBS MODERATE 55: CPT | Performed by: STUDENT IN AN ORGANIZED HEALTH CARE EDUCATION/TRAINING PROGRAM

## 2024-10-21 PROCEDURE — 99285 EMERGENCY DEPT VISIT HI MDM: CPT

## 2024-10-21 PROCEDURE — 85730 THROMBOPLASTIN TIME PARTIAL: CPT | Performed by: FAMILY MEDICINE

## 2024-10-21 PROCEDURE — 84484 ASSAY OF TROPONIN QUANT: CPT | Performed by: FAMILY MEDICINE

## 2024-10-21 PROCEDURE — 85379 FIBRIN DEGRADATION QUANT: CPT | Performed by: FAMILY MEDICINE

## 2024-10-21 RX ORDER — DILTIAZEM HCL/D5W 125 MG/125
5-15 PLASTIC BAG, INJECTION (ML) INTRAVENOUS
Status: DISCONTINUED | OUTPATIENT
Start: 2024-10-21 | End: 2024-10-22 | Stop reason: HOSPADM

## 2024-10-21 RX ORDER — ONDANSETRON 4 MG/1
4 TABLET, ORALLY DISINTEGRATING ORAL EVERY 6 HOURS PRN
Status: DISCONTINUED | OUTPATIENT
Start: 2024-10-21 | End: 2024-10-22 | Stop reason: HOSPADM

## 2024-10-21 RX ORDER — ONDANSETRON 2 MG/ML
4 INJECTION INTRAMUSCULAR; INTRAVENOUS EVERY 6 HOURS PRN
Status: DISCONTINUED | OUTPATIENT
Start: 2024-10-21 | End: 2024-10-22 | Stop reason: HOSPADM

## 2024-10-21 RX ORDER — FLECAINIDE ACETATE 50 MG/1
50 TABLET ORAL 2 TIMES DAILY
Status: DISCONTINUED | OUTPATIENT
Start: 2024-10-21 | End: 2024-10-22

## 2024-10-21 RX ORDER — POLYETHYLENE GLYCOL 3350 17 G/17G
17 POWDER, FOR SOLUTION ORAL DAILY PRN
Status: DISCONTINUED | OUTPATIENT
Start: 2024-10-21 | End: 2024-10-22 | Stop reason: HOSPADM

## 2024-10-21 RX ORDER — DILTIAZEM HYDROCHLORIDE 5 MG/ML
10 INJECTION INTRAVENOUS ONCE
Status: COMPLETED | OUTPATIENT
Start: 2024-10-21 | End: 2024-10-21

## 2024-10-21 RX ORDER — WARFARIN SODIUM 5 MG/1
5 TABLET ORAL
Status: DISCONTINUED | OUTPATIENT
Start: 2024-10-21 | End: 2024-10-22 | Stop reason: HOSPADM

## 2024-10-21 RX ORDER — BISACODYL 10 MG
10 SUPPOSITORY, RECTAL RECTAL DAILY PRN
Status: DISCONTINUED | OUTPATIENT
Start: 2024-10-21 | End: 2024-10-22 | Stop reason: HOSPADM

## 2024-10-21 RX ORDER — AMOXICILLIN 250 MG
2 CAPSULE ORAL 2 TIMES DAILY
Status: DISCONTINUED | OUTPATIENT
Start: 2024-10-21 | End: 2024-10-22 | Stop reason: HOSPADM

## 2024-10-21 RX ORDER — NICOTINE POLACRILEX 4 MG
15 LOZENGE BUCCAL
Status: DISCONTINUED | OUTPATIENT
Start: 2024-10-21 | End: 2024-10-22 | Stop reason: HOSPADM

## 2024-10-21 RX ORDER — METOPROLOL TARTRATE 50 MG
75 TABLET ORAL 2 TIMES DAILY
COMMUNITY

## 2024-10-21 RX ORDER — INSULIN LISPRO 100 [IU]/ML
2-7 INJECTION, SOLUTION INTRAVENOUS; SUBCUTANEOUS
Status: DISCONTINUED | OUTPATIENT
Start: 2024-10-21 | End: 2024-10-22 | Stop reason: HOSPADM

## 2024-10-21 RX ORDER — METOPROLOL TARTRATE 50 MG
50 TABLET ORAL DAILY
Status: DISCONTINUED | OUTPATIENT
Start: 2024-10-22 | End: 2024-10-21

## 2024-10-21 RX ORDER — LOSARTAN POTASSIUM 50 MG/1
50 TABLET ORAL 2 TIMES DAILY
Status: DISCONTINUED | OUTPATIENT
Start: 2024-10-21 | End: 2024-10-22 | Stop reason: HOSPADM

## 2024-10-21 RX ORDER — INSULIN DEGLUDEC 200 U/ML
44 INJECTION, SOLUTION SUBCUTANEOUS DAILY
COMMUNITY

## 2024-10-21 RX ORDER — NITROGLYCERIN 0.4 MG/1
0.4 TABLET SUBLINGUAL
Status: DISCONTINUED | OUTPATIENT
Start: 2024-10-21 | End: 2024-10-22 | Stop reason: HOSPADM

## 2024-10-21 RX ORDER — SODIUM CHLORIDE 0.9 % (FLUSH) 0.9 %
10 SYRINGE (ML) INJECTION AS NEEDED
Status: DISCONTINUED | OUTPATIENT
Start: 2024-10-21 | End: 2024-10-22 | Stop reason: HOSPADM

## 2024-10-21 RX ORDER — ACETAMINOPHEN 325 MG/1
650 TABLET ORAL EVERY 4 HOURS PRN
Status: DISCONTINUED | OUTPATIENT
Start: 2024-10-21 | End: 2024-10-22 | Stop reason: HOSPADM

## 2024-10-21 RX ORDER — SODIUM CHLORIDE 0.9 % (FLUSH) 0.9 %
10 SYRINGE (ML) INJECTION EVERY 12 HOURS SCHEDULED
Status: DISCONTINUED | OUTPATIENT
Start: 2024-10-21 | End: 2024-10-22 | Stop reason: HOSPADM

## 2024-10-21 RX ORDER — DEXTROSE MONOHYDRATE 25 G/50ML
25 INJECTION, SOLUTION INTRAVENOUS
Status: DISCONTINUED | OUTPATIENT
Start: 2024-10-21 | End: 2024-10-22 | Stop reason: HOSPADM

## 2024-10-21 RX ORDER — IBUPROFEN 600 MG/1
1 TABLET ORAL
Status: DISCONTINUED | OUTPATIENT
Start: 2024-10-21 | End: 2024-10-22 | Stop reason: HOSPADM

## 2024-10-21 RX ORDER — BISACODYL 5 MG/1
5 TABLET, DELAYED RELEASE ORAL DAILY PRN
Status: DISCONTINUED | OUTPATIENT
Start: 2024-10-21 | End: 2024-10-22 | Stop reason: HOSPADM

## 2024-10-21 RX ORDER — WARFARIN SODIUM 5 MG/1
5 TABLET ORAL 3 TIMES WEEKLY
COMMUNITY

## 2024-10-21 RX ORDER — METOPROLOL TARTRATE 25 MG/1
25 TABLET, FILM COATED ORAL EVERY 6 HOURS SCHEDULED
Status: DISCONTINUED | OUTPATIENT
Start: 2024-10-21 | End: 2024-10-22 | Stop reason: HOSPADM

## 2024-10-21 RX ORDER — ACETAMINOPHEN 650 MG/1
650 SUPPOSITORY RECTAL EVERY 4 HOURS PRN
Status: DISCONTINUED | OUTPATIENT
Start: 2024-10-21 | End: 2024-10-22 | Stop reason: HOSPADM

## 2024-10-21 RX ORDER — WARFARIN SODIUM 5 MG/1
2.5 TABLET ORAL
COMMUNITY

## 2024-10-21 RX ORDER — METOPROLOL TARTRATE 25 MG/1
25 TABLET, FILM COATED ORAL NIGHTLY
Status: DISCONTINUED | OUTPATIENT
Start: 2024-10-21 | End: 2024-10-21

## 2024-10-21 RX ORDER — MAGNESIUM SULFATE HEPTAHYDRATE 40 MG/ML
2 INJECTION, SOLUTION INTRAVENOUS ONCE
Status: COMPLETED | OUTPATIENT
Start: 2024-10-21 | End: 2024-10-21

## 2024-10-21 RX ORDER — ACETAMINOPHEN 160 MG/5ML
650 SOLUTION ORAL EVERY 4 HOURS PRN
Status: DISCONTINUED | OUTPATIENT
Start: 2024-10-21 | End: 2024-10-22 | Stop reason: HOSPADM

## 2024-10-21 RX ADMIN — WARFARIN 5 MG: 5 TABLET ORAL at 17:38

## 2024-10-21 RX ADMIN — METOPROLOL TARTRATE 25 MG: 25 TABLET, FILM COATED ORAL at 18:29

## 2024-10-21 RX ADMIN — FLECAINIDE ACETATE 50 MG: 50 TABLET ORAL at 20:06

## 2024-10-21 RX ADMIN — LOSARTAN POTASSIUM 50 MG: 50 TABLET, FILM COATED ORAL at 20:07

## 2024-10-21 RX ADMIN — Medication 7.5 MG/HR: at 15:19

## 2024-10-21 RX ADMIN — MAGNESIUM SULFATE HEPTAHYDRATE 2 G: 2 INJECTION, SOLUTION INTRAVENOUS at 20:07

## 2024-10-21 RX ADMIN — INSULIN LISPRO 6 UNITS: 100 INJECTION, SOLUTION INTRAVENOUS; SUBCUTANEOUS at 21:15

## 2024-10-21 RX ADMIN — DILTIAZEM HYDROCHLORIDE 10 MG: 5 INJECTION INTRAVENOUS at 11:15

## 2024-10-21 RX ADMIN — Medication 5 MG/HR: at 12:36

## 2024-10-21 RX ADMIN — Medication 10 ML: at 20:07

## 2024-10-21 RX ADMIN — Medication 10 MG/HR: at 16:02

## 2024-10-21 NOTE — Clinical Note
Level of Care: Med/Surg [1]   Diagnosis: Atrial fibrillation with rapid ventricular response [253252]   Certification: I Certify That Inpatient Hospital Services Are Medically Necessary For Greater Than 2 Midnights

## 2024-10-21 NOTE — ED PROVIDER NOTES
Subjective   History of Present Illness  78-year-old female presents emergency room with complaint of shortness of breath.  And abdominal tightness.  She states that this has been going on since last week however worse today.  She went to her doctor's office today.  They tested for COVID, flu, RSV and she test negative for other viruses.  She states that she also had an EKG performed and she was found to be in atrial fibrillation with RVR.  Patient has no chest pain.  Patient has a productive cough.  Patient has no fevers.  Patient denies any other symptoms at this time.      Review of Systems   All other systems reviewed and are negative.      Past Medical History:   Diagnosis Date    Allergic rhinitis     Carcinoid tumor of ovary     Chronic kidney disease     Colon polyp     Diabetes mellitus     Dysphagia     Family history of colon cancer     Hemorrhoids     Hiatal hernia     History of adenomatous polyp of colon     Hyperlipidemia     Hypertension     Ovarian mass     Spastic colon     Venous insufficiency     Venous stasis dermatitis        Allergies   Allergen Reactions    Augmentin [Amoxicillin-Pot Clavulanate] Other (See Comments)     unknown    Crestor [Rosuvastatin] Other (See Comments)     unknown    Lipitor [Atorvastatin] Other (See Comments)     unknown    Niaspan [Niacin] Diarrhea    Prednisone Other (See Comments)     All oral steroids    Sulfa Antibiotics Other (See Comments)     unknown    Zocor [Simvastatin] Other (See Comments)     unknown       Past Surgical History:   Procedure Laterality Date    APPENDECTOMY      CATARACT EXTRACTION      CHOLECYSTECTOMY      COLONOSCOPY  04/27/2016    normal    ENDOSCOPY AND COLONOSCOPY  04/10/2013    small hiatal hernia, 3 polyps, adenomatous    HYSTERECTOMY      LIPOMA EXCISION      ORIF ANKLE FRACTURE      TRIGGER FINGER RELEASE         Family History   Problem Relation Age of Onset    Colon cancer Paternal Aunt 76    Colon polyps Neg Hx     Esophageal  cancer Neg Hx     Liver cancer Neg Hx     Stomach cancer Neg Hx     Rectal cancer Neg Hx     Liver disease Neg Hx        Social History     Socioeconomic History    Marital status:    Tobacco Use    Smoking status: Never    Smokeless tobacco: Never   Vaping Use    Vaping status: Never Used   Substance and Sexual Activity    Alcohol use: Not Currently    Drug use: Defer    Sexual activity: Defer           Objective   Physical Exam  Vitals and nursing note reviewed.   Constitutional:       Appearance: She is well-developed.   HENT:      Head: Normocephalic and atraumatic.   Eyes:      Extraocular Movements: Extraocular movements intact.      Pupils: Pupils are equal, round, and reactive to light.   Cardiovascular:      Rate and Rhythm: Tachycardia present. Rhythm irregular.   Pulmonary:      Effort: Pulmonary effort is normal.      Breath sounds: Normal breath sounds.   Chest:      Chest wall: No tenderness.   Abdominal:      General: Bowel sounds are normal.      Palpations: Abdomen is soft.      Tenderness: There is no abdominal tenderness.   Skin:     General: Skin is warm and dry.   Neurological:      General: No focal deficit present.      Mental Status: She is alert and oriented to person, place, and time.   Psychiatric:         Mood and Affect: Mood normal.         Behavior: Behavior normal.         Procedures           ED Course  ED Course as of 10/21/24 1312   Mon Oct 21, 2024   1248 EKG rate 133  Atrial fibrillation with RVR [RP]      ED Course User Index  [RP] Carloz Guthrie MD                                           Lab Results (last 24 hours)       Procedure Component Value Units Date/Time    CBC & Differential [[id number]]  (Abnormal) Collected: 10/21/24 1113    Specimen: Blood Updated: 10/21/24 1124    Narrative:      The following orders were created for panel order CBC & Differential.  Procedure                               Abnormality         Status                     ---------                                -----------         ------                     CBC Auto Differential[[id number]]        Abnormal            Final result                 Please view results for these tests on the individual orders.    Basic Metabolic Panel [[id number]]  (Abnormal) Collected: 10/21/24 1113    Specimen: Blood Updated: 10/21/24 1156     Glucose 111 mg/dL      BUN 23 mg/dL      Creatinine 0.97 mg/dL      Sodium 139 mmol/L      Potassium 4.0 mmol/L      Comment: Slight hemolysis detected by analyzer. Result may be falsely elevated.        Chloride 104 mmol/L      CO2 25.0 mmol/L      Calcium 9.2 mg/dL      BUN/Creatinine Ratio 23.7     Anion Gap 10.0 mmol/L      eGFR 59.2 mL/min/1.73     Narrative:      GFR Normal >60  Chronic Kidney Disease <60  Kidney Failure <15    The GFR formula is only valid for adults with stable renal function between ages 18 and 70.    High Sensitivity Troponin T [[id number]]  (Abnormal) Collected: 10/21/24 1113    Specimen: Blood Updated: 10/21/24 1149     HS Troponin T 17 ng/L     Narrative:      High Sensitive Troponin T Reference Range:  <14.0 ng/L- Negative Female for AMI  <22.0 ng/L- Negative Male for AMI  >=14 - Abnormal Female indicating possible myocardial injury.  >=22 - Abnormal Male indicating possible myocardial injury.   Clinicians would have to utilize clinical acumen, EKG, Troponin, and serial changes to determine if it is an Acute Myocardial Infarction or myocardial injury due to an underlying chronic condition.         Magnesium [[id number]]  (Normal) Collected: 10/21/24 1113    Specimen: Blood Updated: 10/21/24 1156     Magnesium 1.8 mg/dL     D-dimer, Quantitative [[id number]]  (Normal) Collected: 10/21/24 1113    Specimen: Blood Updated: 10/21/24 1134     D-Dimer, Quantitative 0.43 MCGFEU/mL     Narrative:      According to the assay 's published package insert, a normal (<0.50 MCGFEU/mL) D-dimer result in conjunction with a non-high clinical probability assessment,  "excludes deep vein thrombosis (DVT) and pulmonary embolism (PE) with high sensitivity.    D-dimer values increase with age and this can make VTE exclusion of an older population difficult. To address this, the [employer], based on best available evidence and recent guidelines, recommends that clinicians use age-adjusted D-dimer thresholds in patients greater than 50 years of age with: a) a low probability of PE who do not meet all Pulmonary Embolism Rule Out Criteria, or b) in those with intermediate probability of PE.   The formula for an age-adjusted D-dimer cut-off is \"age/100\".  For example, a 60 year old patient would have an age-adjusted cut-off of 0.60 MCGFEU/mL and an 80 year old 0.80 MCGFEU/mL.    BNP [[id number]]  (Abnormal) Collected: 10/21/24 1113    Specimen: Blood Updated: 10/21/24 1148     proBNP 5,003.0 pg/mL     Narrative:      This assay is used as an aid in the diagnosis of individuals suspected of having heart failure. It can be used as an aid in the diagnosis of acute decompensated heart failure (ADHF) in patients presenting with signs and symptoms of ADHF to the emergency department (ED). In addition, NT-proBNP of <300 pg/mL indicates ADHF is not likely.    Age Range Result Interpretation  NT-proBNP Concentration (pg/mL:      <50             Positive            >450                   Gray                 300-450                    Negative             <300    50-75           Positive            >900                  Gray                300-900                  Negative            <300      >75             Positive            >1800                  Gray                300-1800                  Negative            <300    Protime-INR [[id number]]  (Abnormal) Collected: 10/21/24 1113    Specimen: Blood Updated: 10/21/24 1134     Protime 22.2 Seconds      INR 1.86    aPTT [[id number]]  (Abnormal) Collected: 10/21/24 1113    Specimen: Blood Updated: 10/21/24 1134     PTT 39.9 " seconds     CBC Auto Differential [[id number]]  (Abnormal) Collected: 10/21/24 1113    Specimen: Blood Updated: 10/21/24 1124     WBC 13.77 10*3/mm3      RBC 4.10 10*6/mm3      Hemoglobin 12.2 g/dL      Hematocrit 38.1 %      MCV 92.9 fL      MCH 29.8 pg      MCHC 32.0 g/dL      RDW 12.3 %      RDW-SD 42.3 fl      MPV 11.5 fL      Platelets 234 10*3/mm3      Neutrophil % 84.4 %      Lymphocyte % 6.5 %      Monocyte % 7.7 %      Eosinophil % 0.6 %      Basophil % 0.4 %      Immature Grans % 0.4 %      Neutrophils, Absolute 11.63 10*3/mm3      Lymphocytes, Absolute 0.89 10*3/mm3      Monocytes, Absolute 1.06 10*3/mm3      Eosinophils, Absolute 0.08 10*3/mm3      Basophils, Absolute 0.06 10*3/mm3      Immature Grans, Absolute 0.05 10*3/mm3      nRBC 0.0 /100 WBC     POC Glucose Once [[id number]]  (Normal) Collected: 10/21/24 1203    Specimen: Blood Updated: 10/21/24 1214     Glucose 85 mg/dL      Comment: : [id number] Michelle ClarissaMeter ID: [id number]             Medications   sodium chloride 0.9 % flush 10 mL (has no administration in time range)   dilTIAZem (CARDIZEM) 125 mg in 125 mL D5W infusion (5 mg/hr Intravenous New Bag 10/21/24 1236)   dilTIAZem (CARDIZEM) injection 10 mg (10 mg Intravenous Given 10/21/24 1115)     XR Chest 1 View   Final Result   1. Hypoventilation with vascular crowding.   2. Patchy infiltrates in the mid and lower lung zones may represent mild   pulmonary edema.   3. Cardiomegaly with vascular redistribution.   4. Blunting of the left costophrenic angle suggesting a small effusion.               This report was signed and finalized on 10/21/2024 11:09 AM by Dr. Gonzalez Acevedo MD.              Medical Decision Making  78-year-old female found to be in atrial fibrillation with RVR.  Patient was tachycardic.  Heart rate was in the 130s.  Cardizem bolus was given with no improvement of her symptoms.  Patient was then placed on Cardizem drip.  Discussed case with Leonarda nurse practitioner  with the hospitalist group.  She has accepted the patient under the services of Dr. Noah Chris.    Problems Addressed:  Atrial fibrillation with RVR: complicated acute illness or injury  Shortness of breath: complicated acute illness or injury    Amount and/or Complexity of Data Reviewed  Labs: ordered. Decision-making details documented in ED Course.  Radiology: ordered. Decision-making details documented in ED Course.  ECG/medicine tests: ordered. Decision-making details documented in ED Course.    Risk  Prescription drug management.  Decision regarding hospitalization.        Final diagnoses:   Atrial fibrillation with RVR   Shortness of breath       ED Disposition  ED Disposition       ED Disposition   Decision to Admit    Condition   --    Comment   Level of Care: Telemetry [5]   Diagnosis: Atrial fibrillation with RVR [[id number]]   Admitting Physician: NOAH CHRIS [[id number]]   Attending Physician: NOAH CHRIS [[id number]]                 No follow-up provider specified.       Medication List      No changes were made to your prescriptions during this visit.            Carloz Guthrie MD  10/21/24 6101

## 2024-10-21 NOTE — H&P
"    [facility] Medicine Services  HISTORY AND PHYSICAL    Date of Admission: 10/21/2024  Primary Care Physician: Sky Haro MD    Subjective   Primary Historian: Patient    Chief Complaint: shortness of breath    History of Present Illness  Ms. Nichols is an 78-year-old female that presented to [facility] with shortness of breath.  She has a past medical history significant for paroxysmal atrial fibrillation and is chronically anticoagulated on Coumadin, hypertension, hyperlipidemia and DVT.  She is on Coumadin as she could not afford Eliquis or other DOAC's.  She follows with Dr. Haro for her primary care.  Over the last week she felt like she may have had a viral illness as she had complained of some cough, congestion and \"aching all over.\"  She had an appointment to get her eyes checked today.  While in office she complained of shortness of breath and could feel her heart racing.  EKG in office revealed atrial fibrillation with rapid ventricular response.  Reportedly tested her for COVID, flu, and RSV which were negative.  No chest pain or pressure.  No fever or chills.  No nausea, vomiting, abdominal pain, diarrhea.  She was advised to present to the ED for further evaluation. In the ED, she was found to be in atrial fibrillation with rapid ventricular response.  INR subtherapeutic at 1.86.  BNP 5003.  High-sensitivity troponin 17 with repeat 14 and -3 delta.  She was given Cardizem injection followed by infusion.  D-dimer negative.  WBC 13.  Chest x-ray revealed hypoventilation with vascular crowding. Patchy infiltrates in the mid and lower lung zones may represent mild pulmonary edema. She will be admitted to the hospitalist service for further evaluation and management.    Review of Systems   Otherwise complete ROS reviewed and negative except as mentioned in the HPI.    Past Medical History:   Past Medical History:   Diagnosis Date    Allergic rhinitis "     Carcinoid tumor of ovary     Chronic kidney disease     Colon polyp     Diabetes mellitus     Dysphagia     Family history of colon cancer     Hemorrhoids     Hiatal hernia     History of adenomatous polyp of colon     Hyperlipidemia     Hypertension     Ovarian mass     Spastic colon     Venous insufficiency     Venous stasis dermatitis      Past Surgical History:  Past Surgical History:   Procedure Laterality Date    APPENDECTOMY      CATARACT EXTRACTION      CHOLECYSTECTOMY      COLONOSCOPY  04/27/2016    normal    ENDOSCOPY AND COLONOSCOPY  04/10/2013    small hiatal hernia, 3 polyps, adenomatous    HYSTERECTOMY      LIPOMA EXCISION      ORIF ANKLE FRACTURE      TRIGGER FINGER RELEASE       Social History:  reports that she has never smoked. She has never used smokeless tobacco. She reports that she does not currently use alcohol. Drug use questions deferred to the physician.    Family History: family history includes Colon cancer (age of onset: 76) in her paternal aunt.       Allergies:  Allergies   Allergen Reactions    Augmentin [Amoxicillin-Pot Clavulanate] Other (See Comments)     unknown    Crestor [Rosuvastatin] Other (See Comments)     unknown    Lipitor [Atorvastatin] Other (See Comments)     unknown    Niaspan [Niacin] Diarrhea    Prednisone Other (See Comments)     All oral steroids    Sulfa Antibiotics Other (See Comments)     unknown    Zocor [Simvastatin] Other (See Comments)     unknown       Medications:  Prior to Admission medications    Medication Sig Start Date End Date Taking? Authorizing Provider   Diclofenac Sodium (VOLTAREN) 1 % gel gel Apply 2 g topically to the appropriate area as directed As Needed (pain).   Yes Provider, MD Toyin   flecainide (TAMBOCOR) 50 MG tablet Take 1 tablet by mouth 2 (Two) Times a Day. 8/16/24  Yes Dee Dee Membreno PA   insulin aspart (novoLOG) 100 UNIT/ML injection Inject 12 Units under the skin into the appropriate area as directed 3 (Three) Times a  Day Before Meals. Sliding Scale: Max of 50 units daily   Yes Toyin Henriquez MD   Insulin Degludec (TRESIBA FLEXTOUCH) 200 UNIT/ML solution pen-injector pen injection Inject 34 Units under the skin into the appropriate area as directed Daily.   Yes Toyin Henriquez MD   losartan (COZAAR) 50 MG tablet Take 1 tablet by mouth 2 (Two) Times a Day.   Yes Toyin Henriquez MD   multivitamin with minerals tablet tablet Take 1 tablet by mouth Daily.   Yes Toyin Henriquez MD   spironolactone (ALDACTONE) 50 MG tablet Take 1 tablet by mouth Daily.   Yes Toyin Henriquez MD   warfarin (COUMADIN) 5 MG tablet Take 1 tablet by mouth Daily. 5/14/24  Yes Koki Hanson MD   amLODIPine (NORVASC) 5 MG tablet Take 1 tablet by mouth Daily.  Patient not taking: Reported on 10/21/2024    Toyin Henriquez MD   esomeprazole (nexIUM) 20 MG capsule Take 1 capsule by mouth As Needed.  Patient not taking: Reported on 10/21/2024    Toyin Henriquez MD   irbesartan (Avapro) 300 MG tablet Take 1 tablet by mouth Every Night.  Patient not taking: Reported on 10/21/2024    Toyin Henriquez MD   lidocaine (LIDODERM) 5 % Place 1 patch on the skin as directed by provider Daily. Remove & Discard patch within 12 hours or as directed by MD  Patient not taking: Reported on 10/21/2024 9/5/23   Farheen Petit APRN   metoprolol tartrate (LOPRESSOR) 50 MG tablet Take 1 tablet by mouth.    Toyin Henriquez MD   NIFEdipine XL (PROCARDIA XL) 60 MG 24 hr tablet Take 1 tablet by mouth Daily.  Patient not taking: Reported on 10/21/2024    Toyin Henriquez MD   triamcinolone (KENALOG) 0.1 % lotion Apply  topically to the appropriate area as directed 3 (Three) Times a Day.  Patient not taking: Reported on 10/21/2024    Toyin Henriquez MD   calcium carbonate (OS-CLOVER) 600 MG tablet Take 1 tablet by mouth Daily.  10/21/24  Toyin Henriquez MD   Cholecalciferol (VITAMIN D3 PO) Take  by mouth.  10/21/24   "Provider, MD Toyin     I have utilized all available immediate resources to obtain, update, or review the patient's current medications (including all prescriptions, over-the-counter products, herbals, cannabis/cannabidiol products, and vitamin/mineral/dietary (nutritional) supplements).    Objective     Vital Signs: /76   Pulse (!) 124   Temp 98.4 °F (36.9 °C) (Oral)   Resp 16   Ht 162.6 cm (64\")   Wt 86.6 kg (191 lb)   SpO2 96%   BMI 32.79 kg/m²   Physical Exam  Vitals reviewed.   Constitutional:       General: She is not in acute distress.     Appearance: She is obese. She is not toxic-appearing.   HENT:      Head: Normocephalic and atraumatic.      Mouth/Throat:      Mouth: Mucous membranes are moist.      Pharynx: Oropharynx is clear.   Eyes:      Extraocular Movements: Extraocular movements intact.      Conjunctiva/sclera: Conjunctivae normal.      Pupils: Pupils are equal, round, and reactive to light.   Cardiovascular:      Rate and Rhythm: Tachycardia present. Rhythm irregular.      Pulses: Normal pulses.   Pulmonary:      Effort: Pulmonary effort is normal. No respiratory distress.      Breath sounds: Normal breath sounds.   Abdominal:      General: Bowel sounds are normal. There is no distension.      Palpations: Abdomen is soft.      Tenderness: There is no abdominal tenderness.   Musculoskeletal:         General: No swelling or tenderness. Normal range of motion.      Cervical back: Normal range of motion and neck supple. No muscular tenderness.   Skin:     General: Skin is warm and dry.      Findings: No erythema or rash.   Neurological:      General: No focal deficit present.      Mental Status: She is alert and oriented to person, place, and time.      Cranial Nerves: No cranial nerve deficit.      Motor: No weakness.   Psychiatric:         Mood and Affect: Mood normal.         Behavior: Behavior normal.        Results Reviewed:  Lab Results (last 24 hours)       Procedure " Component Value Units Date/Time    High Sensitivity Troponin T 2Hr [[id number]]  (Abnormal) Collected: 10/21/24 1309    Specimen: Blood Updated: 10/21/24 1343     HS Troponin T 14 ng/L      Comment: Specimen hemolyzed.  Results may be falsely decreased.        Troponin T Delta -3 ng/L     Narrative:      High Sensitive Troponin T Reference Range:  <14.0 ng/L- Negative Female for AMI  <22.0 ng/L- Negative Male for AMI  >=14 - Abnormal Female indicating possible myocardial injury.  >=22 - Abnormal Male indicating possible myocardial injury.   Clinicians would have to utilize clinical acumen, EKG, Troponin, and serial changes to determine if it is an Acute Myocardial Infarction or myocardial injury due to an underlying chronic condition.         POC Glucose Once [[id number]]  (Normal) Collected: 10/21/24 1203    Specimen: Blood Updated: 10/21/24 1214     Glucose 85 mg/dL      Comment: : [id number] Michelle ClarissaMeter ID: [id number]       Basic Metabolic Panel [[id number]]  (Abnormal) Collected: 10/21/24 1113    Specimen: Blood Updated: 10/21/24 1156     Glucose 111 mg/dL      BUN 23 mg/dL      Creatinine 0.97 mg/dL      Sodium 139 mmol/L      Potassium 4.0 mmol/L      Comment: Slight hemolysis detected by analyzer. Result may be falsely elevated.        Chloride 104 mmol/L      CO2 25.0 mmol/L      Calcium 9.2 mg/dL      BUN/Creatinine Ratio 23.7     Anion Gap 10.0 mmol/L      eGFR 59.2 mL/min/1.73     Narrative:      GFR Normal >60  Chronic Kidney Disease <60  Kidney Failure <15    The GFR formula is only valid for adults with stable renal function between ages 18 and 70.    Magnesium [[id number]]  (Normal) Collected: 10/21/24 1113    Specimen: Blood Updated: 10/21/24 1156     Magnesium 1.8 mg/dL     High Sensitivity Troponin T [[id number]]  (Abnormal) Collected: 10/21/24 1113    Specimen: Blood Updated: 10/21/24 1149     HS Troponin T 17 ng/L     Narrative:      High Sensitive Troponin T Reference Range:  <14.0 ng/L-  Negative Female for AMI  <22.0 ng/L- Negative Male for AMI  >=14 - Abnormal Female indicating possible myocardial injury.  >=22 - Abnormal Male indicating possible myocardial injury.   Clinicians would have to utilize clinical acumen, EKG, Troponin, and serial changes to determine if it is an Acute Myocardial Infarction or myocardial injury due to an underlying chronic condition.         BNP [[id number]]  (Abnormal) Collected: 10/21/24 1113    Specimen: Blood Updated: 10/21/24 1148     proBNP 5,003.0 pg/mL     Narrative:      This assay is used as an aid in the diagnosis of individuals suspected of having heart failure. It can be used as an aid in the diagnosis of acute decompensated heart failure (ADHF) in patients presenting with signs and symptoms of ADHF to the emergency department (ED). In addition, NT-proBNP of <300 pg/mL indicates ADHF is not likely.    Age Range Result Interpretation  NT-proBNP Concentration (pg/mL:      <50             Positive            >450                   Gray                 300-450                    Negative             <300    50-75           Positive            >900                  Gray                300-900                  Negative            <300      >75             Positive            >1800                  Gray                300-1800                  Negative            <300    D-dimer, Quantitative [[id number]]  (Normal) Collected: 10/21/24 1113    Specimen: Blood Updated: 10/21/24 1134     D-Dimer, Quantitative 0.43 MCGFEU/mL     Narrative:      According to the assay 's published package insert, a normal (<0.50 MCGFEU/mL) D-dimer result in conjunction with a non-high clinical probability assessment, excludes deep vein thrombosis (DVT) and pulmonary embolism (PE) with high sensitivity.    D-dimer values increase with age and this can make VTE exclusion of an older population difficult. To address this, the [employer], based on best  "available evidence and recent guidelines, recommends that clinicians use age-adjusted D-dimer thresholds in patients greater than 50 years of age with: a) a low probability of PE who do not meet all Pulmonary Embolism Rule Out Criteria, or b) in those with intermediate probability of PE.   The formula for an age-adjusted D-dimer cut-off is \"age/100\".  For example, a 60 year old patient would have an age-adjusted cut-off of 0.60 MCGFEU/mL and an 80 year old 0.80 MCGFEU/mL.    Protime-INR [[id number]]  (Abnormal) Collected: 10/21/24 1113    Specimen: Blood Updated: 10/21/24 1134     Protime 22.2 Seconds      INR 1.86    aPTT [[id number]]  (Abnormal) Collected: 10/21/24 1113    Specimen: Blood Updated: 10/21/24 1134     PTT 39.9 seconds     CBC & Differential [[id number]]  (Abnormal) Collected: 10/21/24 1113    Specimen: Blood Updated: 10/21/24 1124    Narrative:      The following orders were created for panel order CBC & Differential.  Procedure                               Abnormality         Status                     ---------                               -----------         ------                     CBC Auto Differential[[id number]]        Abnormal            Final result                 Please view results for these tests on the individual orders.    CBC Auto Differential [[id number]]  (Abnormal) Collected: 10/21/24 1113    Specimen: Blood Updated: 10/21/24 1124     WBC 13.77 10*3/mm3      RBC 4.10 10*6/mm3      Hemoglobin 12.2 g/dL      Hematocrit 38.1 %      MCV 92.9 fL      MCH 29.8 pg      MCHC 32.0 g/dL      RDW 12.3 %      RDW-SD 42.3 fl      MPV 11.5 fL      Platelets 234 10*3/mm3      Neutrophil % 84.4 %      Lymphocyte % 6.5 %      Monocyte % 7.7 %      Eosinophil % 0.6 %      Basophil % 0.4 %      Immature Grans % 0.4 %      Neutrophils, Absolute 11.63 10*3/mm3      Lymphocytes, Absolute 0.89 10*3/mm3      Monocytes, Absolute 1.06 10*3/mm3      Eosinophils, Absolute 0.08 10*3/mm3      Basophils, Absolute " 0.06 10*3/mm3      Immature Grans, Absolute 0.05 10*3/mm3      nRBC 0.0 /100 WBC           Imaging Results (Last 24 Hours)       Procedure Component Value Units Date/Time    XR Chest 1 View [[id number]] Collected: 10/21/24 1108     Updated: 10/21/24 1112    Narrative:      EXAMINATION:  XR CHEST 1 VW-  10/21/2024 10:00 AM     HISTORY: Shortness of breath. 2/1/2008.     COMPARISON: No comparison study.     TECHNIQUE: Single view AP image.     FINDINGS: There is hypoventilation with vascular crowding. There are  patchy interstitial appearing infiltrates in the mid and lower lung  zones. There is mild blunting of the left costophrenic angle. There is  cardiomegaly. There are degenerative changes of the spine.          Impression:      1. Hypoventilation with vascular crowding.  2. Patchy infiltrates in the mid and lower lung zones may represent mild  pulmonary edema.  3. Cardiomegaly with vascular redistribution.  4. Blunting of the left costophrenic angle suggesting a small effusion.           This report was signed and finalized on 10/21/2024 11:09 AM by Dr. Gonzalez Acevedo MD.             I have personally reviewed and interpreted the radiology studies and ECG obtained at time of admission.     Assessment / Plan   Assessment:   Active Hospital Problems    Diagnosis     **Atrial fibrillation with RVR     Subtherapeutic international normalized ratio (INR)     Class 1 obesity with body mass index (BMI) of 32.0 to 32.9 in adult     Atrial fibrillation with rapid ventricular response     Type 2 diabetes mellitus     Chronic anticoagulation        Treatment Plan  The patient will be admitted to Dr. Pickens's service at [facility].    Atrial fibrillation with rapid ventricular response.  She follows with the EP as outpatient and was recently started on flecainide.  Continue Cardizem drip.  Consult EP.    Start Accu-Cheks with sliding scale insulin.    Review and resume home medications as  appropriate.    Coumadin will serve as VTE prophylaxis.    Labs in AM.    Medical Decision Making  Number and Complexity of problems: 1 acute problem  Differential Diagnosis: As above    Conditions and Status        Condition is worsening.     [facility] Data  External documents reviewed: Prior epic records  Cardiac tracing (EKG, telemetry) interpretation: Reviewed  Radiology interpretation: Interpreted by radiology  Labs reviewed: As above  Any tests that were considered but not ordered: None considered at present     Decision rules/scores evaluated (example [id number], Wells, etc): None considered at present     Discussed with: Patient and Dr. Pickens     Care Planning  Shared decision making: Patient is agreeable to ongoing workup and treatment  Code status and discussions: Full code with full interventions    Disposition  Social Determinants of Health that impact treatment or disposition: None  Estimated length of stay is 1-2 days.     I confirmed that the patient's advanced care plan is present, code status is documented, and a surrogate decision maker is listed in the patient's medical record.     The patient's surrogate decision maker is her spouse Ameya Nichols.     The patient was seen and examined by me on 10/21/2024 at 14:05.    Electronically signed by ROJELIO Zhong, 10/21/24, 13:53 CDT.

## 2024-10-21 NOTE — CONSULTS
"EP CONSULT NOTE    Subjective        History of Present Illness    EP Problems:  1.  Palpitations  2.  PAF  -6/7/24: Holter with afib      Cardiology Problems:  1.  Hypertension  2.  Hyperlipidemia  3.  DVT     Medical Problems:  1.  GERD  2.  Obesity  3.  CKD  4.  Type 2 diabetes  5.  Carcinoid tumor    Kiesha Nichols is a 78 y.o. female with problem list as above for whom EP is consulted regarding atrial fibrillation.  She has a known history of paroxysmal atrial fibrillation seen on previous Holter monitoring.  She has previously been treated with flecainide.  She was doing reasonably well and states that this morning she checked her pulse oximeter and saw that her heart rates were normal.  When she came to Dr. Haro's office, she was having increased shortness of breath with exertion.  In that time, she was found to be in atrial fibrillation with RVR.  She was sent to the hospital for admission.    Objective   Vital Signs:  /82 (BP Location: Right arm, Patient Position: Lying)   Pulse (!) 131   Temp 98.5 °F (36.9 °C) (Oral)   Resp 18   Ht 162.6 cm (64\")   Wt 86.6 kg (191 lb)   SpO2 98%   BMI 32.79 kg/m²   Estimated body mass index is 32.79 kg/m² as calculated from the following:    Height as of this encounter: 162.6 cm (64\").    Weight as of this encounter: 86.6 kg (191 lb).      Physical Exam  Cardiovascular:      Rate and Rhythm: Tachycardia present. Rhythm irregular.          Result Review :  The following data was reviewed by: Koki Hanson MD on 10/21/2024:  CMP          9/27/2024    13:36 10/21/2024    11:13   CMP   Glucose 195  111    BUN 28  23    Creatinine 1.32  0.97    EGFR 40.9  59.2    Sodium 144  139    Potassium 4.4  4.0    Chloride 107  104    Calcium 9.1  9.2    BUN/Creatinine Ratio 21.2  23.7    Anion Gap 10.0  10.0      CBC          10/21/2024    11:13   CBC   WBC 13.77    RBC 4.10    Hemoglobin 12.2    Hematocrit 38.1    MCV 92.9    MCH 29.8    MCHC 32.0    RDW 12.3  "   Platelets 234      TSH          10/21/2024    13:09   TSH   TSH 0.982        [id number] SCORE   [id number] Score: 5 (10/21/2024  6:11 PM)             Assessment and Plan   Problems:  Paroxysmal atrial fibrillation with acute exacerbation    Kiesha Nichols is a 78 y.o. female with problem list as above for whom EP is consulted regarding paroxysmal atrial fibrillation.  Unclear if still paroxysmal (suspected) versus not having a more persistent course.  Given that her warfarin is slightly subtherapeutic, we will plan to give her 1 additional day to see if she goes back into rhythm on her own before considering a HEATHER and cardioversion.  She has been stable on her flecainide dose and we will continue this for now.  Long-term, it may be reasonable to consider a higher dose of flecainide versus an ablation for her.    Plan:  -Agree with IV diltiazem for rate control for now  -Increase metoprolol to 25 mg every 6 hours  -Continue warfarin for anticoagulation  -Daily INRs  -2 g of IV magnesium ordered given RVR and borderline magnesium levels  -Continue to monitor on telemetry  -Will continue to follow               Part of this note may be an electronic transcription/translation of spoken language to printed text using the Dragon Dictation System.

## 2024-10-22 VITALS
OXYGEN SATURATION: 94 % | DIASTOLIC BLOOD PRESSURE: 62 MMHG | TEMPERATURE: 97.1 F | HEART RATE: 70 BPM | BODY MASS INDEX: 32.61 KG/M2 | HEIGHT: 64 IN | SYSTOLIC BLOOD PRESSURE: 162 MMHG | RESPIRATION RATE: 16 BRPM | WEIGHT: 191 LBS

## 2024-10-22 LAB
ANION GAP SERPL CALCULATED.3IONS-SCNC: 10 MMOL/L (ref 5–15)
BUN SERPL-MCNC: 32 MG/DL (ref 8–23)
BUN/CREAT SERPL: 24.6 (ref 7–25)
CALCIUM SPEC-SCNC: 8.6 MG/DL (ref 8.6–10.5)
CHLORIDE SERPL-SCNC: 103 MMOL/L (ref 98–107)
CO2 SERPL-SCNC: 24 MMOL/L (ref 22–29)
CREAT SERPL-MCNC: 1.3 MG/DL (ref 0.57–1)
EGFRCR SERPLBLD CKD-EPI 2021: 41.7 ML/MIN/1.73
GLUCOSE BLDC GLUCOMTR-MCNC: 126 MG/DL (ref 70–130)
GLUCOSE BLDC GLUCOMTR-MCNC: 189 MG/DL (ref 70–130)
GLUCOSE BLDC GLUCOMTR-MCNC: 236 MG/DL (ref 70–130)
GLUCOSE SERPL-MCNC: 277 MG/DL (ref 65–99)
INR PPP: 1.9 (ref 0.91–1.09)
MAGNESIUM SERPL-MCNC: 2.4 MG/DL (ref 1.6–2.4)
POTASSIUM SERPL-SCNC: 4.2 MMOL/L (ref 3.5–5.2)
PROTHROMBIN TIME: 22.5 SECONDS (ref 11.8–14.8)
SODIUM SERPL-SCNC: 137 MMOL/L (ref 136–145)

## 2024-10-22 PROCEDURE — 80048 BASIC METABOLIC PNL TOTAL CA: CPT | Performed by: NURSE PRACTITIONER

## 2024-10-22 PROCEDURE — 83735 ASSAY OF MAGNESIUM: CPT | Performed by: NURSE PRACTITIONER

## 2024-10-22 PROCEDURE — 82948 REAGENT STRIP/BLOOD GLUCOSE: CPT

## 2024-10-22 PROCEDURE — 85610 PROTHROMBIN TIME: CPT | Performed by: NURSE PRACTITIONER

## 2024-10-22 PROCEDURE — 63710000001 INSULIN LISPRO (HUMAN) PER 5 UNITS: Performed by: NURSE PRACTITIONER

## 2024-10-22 PROCEDURE — 99232 SBSQ HOSP IP/OBS MODERATE 35: CPT | Performed by: STUDENT IN AN ORGANIZED HEALTH CARE EDUCATION/TRAINING PROGRAM

## 2024-10-22 RX ORDER — FLECAINIDE ACETATE 100 MG/1
100 TABLET ORAL 2 TIMES DAILY
Qty: 60 TABLET | Refills: 11 | Status: SHIPPED | OUTPATIENT
Start: 2024-10-22

## 2024-10-22 RX ORDER — FLECAINIDE ACETATE 100 MG/1
100 TABLET ORAL 2 TIMES DAILY
Status: DISCONTINUED | OUTPATIENT
Start: 2024-10-22 | End: 2024-10-22 | Stop reason: HOSPADM

## 2024-10-22 RX ADMIN — INSULIN LISPRO 3 UNITS: 100 INJECTION, SOLUTION INTRAVENOUS; SUBCUTANEOUS at 08:03

## 2024-10-22 RX ADMIN — METOPROLOL TARTRATE 25 MG: 25 TABLET, FILM COATED ORAL at 11:37

## 2024-10-22 RX ADMIN — Medication 10 ML: at 08:04

## 2024-10-22 RX ADMIN — LOSARTAN POTASSIUM 50 MG: 50 TABLET, FILM COATED ORAL at 08:04

## 2024-10-22 RX ADMIN — DOCUSATE SODIUM 50 MG AND SENNOSIDES 8.6 MG 2 TABLET: 8.6; 5 TABLET, FILM COATED ORAL at 08:04

## 2024-10-22 RX ADMIN — METOPROLOL TARTRATE 25 MG: 25 TABLET, FILM COATED ORAL at 06:15

## 2024-10-22 RX ADMIN — FLECAINIDE ACETATE 50 MG: 50 TABLET ORAL at 08:04

## 2024-10-22 NOTE — PLAN OF CARE
Problem: Adult Inpatient Plan of Care  Goal: Plan of Care Review  Outcome: Progressing  Goal: Patient-Specific Goal (Individualized)  Outcome: Progressing  Goal: Absence of Hospital-Acquired Illness or Injury  Outcome: Progressing  Intervention: Identify and Manage Fall Risk  Recent Flowsheet Documentation  Taken 10/22/2024 0000 by Saundra Garcia RN  Safety Promotion/Fall Prevention: safety round/check completed  Taken 10/21/2024 2200 by Saundra Garcia RN  Safety Promotion/Fall Prevention: safety round/check completed  Taken 10/21/2024 2000 by Saundra Garcia RN  Safety Promotion/Fall Prevention: safety round/check completed  Intervention: Prevent Skin Injury  Recent Flowsheet Documentation  Taken 10/21/2024 2000 by Saundra Garcia RN  Body Position: position changed independently  Goal: Optimal Comfort and Wellbeing  Outcome: Progressing  Intervention: Provide Person-Centered Care  Recent Flowsheet Documentation  Taken 10/21/2024 2000 by Saundra Garcia RN  Trust Relationship/Rapport:   care explained   choices provided   questions answered   questions encouraged   reassurance provided  Goal: Readiness for Transition of Care  Outcome: Progressing   Goal Outcome Evaluation:

## 2024-10-22 NOTE — DISCHARGE SUMMARY
[facility] Medicine Services  DISCHARGE SUMMARY       Date of Admission: 10/21/2024  Date of Discharge:  10/22/2024  Primary Care Physician: Sky Haro MD    Presenting Problem/History of Present Illness:  Shortness of breath    Final Discharge Diagnoses:  Active Hospital Problems    Diagnosis     **Atrial fibrillation with RVR     Subtherapeutic international normalized ratio (INR)     Class 1 obesity with body mass index (BMI) of 32.0 to 32.9 in adult     Atrial fibrillation with rapid ventricular response     Type 2 diabetes mellitus     Chronic anticoagulation        Consults: Dr. Hanson with EP.    Procedures Performed: none.    Pertinent Test Results:   Results for orders placed during the hospital encounter of 01/09/24    Adult Transthoracic Echo Complete W/ Cont if Necessary Per Protocol    Interpretation Summary    Left ventricular systolic function is normal. Left ventricular ejection fraction appears to be 56 - 60%.    Left ventricular diastolic dysfunction is noted.    Normal right ventricular cavity size and systolic function noted.    Mild mitral annular calcification is present. Mild mitral valve regurgitation is present.    No evidence of pulmonary hypertension is present.      Imaging Results (All)       Procedure Component Value Units Date/Time    XR Chest 1 View [[id number]] Collected: 10/21/24 1108     Updated: 10/21/24 1112    Narrative:      EXAMINATION:  XR CHEST 1 VW-  10/21/2024 10:00 AM     HISTORY: Shortness of breath. 2/1/2008.     COMPARISON: No comparison study.     TECHNIQUE: Single view AP image.     FINDINGS: There is hypoventilation with vascular crowding. There are  patchy interstitial appearing infiltrates in the mid and lower lung  zones. There is mild blunting of the left costophrenic angle. There is  cardiomegaly. There are degenerative changes of the spine.          Impression:      1. Hypoventilation with vascular crowding.  2.  "Patchy infiltrates in the mid and lower lung zones may represent mild  pulmonary edema.  3. Cardiomegaly with vascular redistribution.  4. Blunting of the left costophrenic angle suggesting a small effusion.           This report was signed and finalized on 10/21/2024 11:09 AM by Dr. Gonzalez Acevedo MD.             LAB RESULTS:      Lab 10/22/24  0256 10/21/24  1113   WBC  --  13.77*   HEMOGLOBIN  --  12.2   HEMATOCRIT  --  38.1   PLATELETS  --  234   NEUTROS ABS  --  11.63*   IMMATURE GRANS (ABS)  --  0.05   LYMPHS ABS  --  0.89   MONOS ABS  --  1.06*   EOS ABS  --  0.08   MCV  --  92.9   PROTIME 22.5* 22.2*   APTT  --  39.9*   D DIMER QUANT  --  0.43         Lab 10/22/24  0256 10/21/24  1309 10/21/24  1113   SODIUM 137  --  139   POTASSIUM 4.2  --  4.0   CHLORIDE 103  --  104   CO2 24.0  --  25.0   ANION GAP 10.0  --  10.0   BUN 32*  --  23   CREATININE 1.30*  --  0.97   EGFR 41.7*  --  59.2*   GLUCOSE 277*  --  111*   CALCIUM 8.6  --  9.2   MAGNESIUM 2.4  --  1.8   TSH  --  0.982  --              Lab 10/22/24  0256 10/21/24  1309 10/21/24  1113 10/17/24  0000   PROBNP  --   --  5,003.0*  --    HSTROP T  --  14* 17*  --    PROTIME 22.5*  --  22.2*  --    INR 1.90*  --  1.86* 2.50                 Brief Urine Lab Results       None          Microbiology Results (last 10 days)       ** No results found for the last 240 hours. **            Hospital Course:   Ms. Nichols is an 78-year-old female that presented to [facility] with shortness of breath.  She has a past medical history significant for paroxysmal atrial fibrillation and is chronically anticoagulated on Coumadin, hypertension, hyperlipidemia and DVT.  She is on Coumadin as she could not afford Eliquis or other DOAC's.  She follows with Dr. Haro for her primary care.  Over the last week she felt like she may have had a viral illness as she had complained of some cough, congestion and \"aching all over.\"  She had an appointment to get her eyes " "checked today.  While in office she complained of shortness of breath and could feel her heart racing.  EKG in office revealed atrial fibrillation with rapid ventricular response.  Reportedly tested her for COVID, flu, and RSV which were negative.  No chest pain or pressure.  No fever or chills.  No nausea, vomiting, abdominal pain, diarrhea.  She was advised to present to the ED for further evaluation. In the ED, she was found to be in atrial fibrillation with rapid ventricular response.  INR subtherapeutic at 1.86.  BNP 5003.  High-sensitivity troponin 17 with repeat 14 and -3 delta.  She was given Cardizem injection followed by infusion.  D-dimer negative.  WBC 13.  Chest x-ray revealed hypoventilation with vascular crowding. Patchy infiltrates in the mid and lower lung zones may represent mild pulmonary edema. She was admitted to the hospitalist service for further evaluation and management.     Atrial fibrillation with rapid ventricular response on admission.  She follows with the EP as outpatient and was recently started on flecainide.  EP, Dr. Hanson consulted.  Overnight, she converted from atrial fibrillation to sinus rhythm.  Dr. Hanson recommends to increase flecainide from 50 to 100 mg twice daily.  She is okay for discharge from EP standpoint with close follow-up as outpatient.     She has reached maximum benefit of hospitalization.  She is medically stable appropriate for discharge today.    Physical Exam on Discharge:  /62 (BP Location: Left arm, Patient Position: Lying)   Pulse 70   Temp 97.1 °F (36.2 °C) (Oral)   Resp 16   Ht 162.6 cm (64\")   Wt 86.6 kg (191 lb)   SpO2 94%   BMI 32.79 kg/m²   Physical Exam  Vitals reviewed.   Constitutional:       General: She is not in acute distress.     Appearance: She is obese. She is not toxic-appearing.   HENT:      Head: Normocephalic and atraumatic.      Mouth/Throat:      Mouth: Mucous membranes are moist.      Pharynx: Oropharynx is clear.   Eyes: "      Extraocular Movements: Extraocular movements intact.      Conjunctiva/sclera: Conjunctivae normal.      Pupils: Pupils are equal, round, and reactive to light.   Cardiovascular:      Rate and Rhythm: Normal rate and regular rhythm.      Pulses: Normal pulses.      Comments: Converted from atrial fibrillation to sinus rhythm at 2200 on 10/21, now sinus 63-66  Pulmonary:      Effort: Pulmonary effort is normal. No respiratory distress.      Breath sounds: Normal breath sounds. No wheezing.   Abdominal:      General: Bowel sounds are normal. There is no distension.      Palpations: Abdomen is soft.      Tenderness: There is no abdominal tenderness.   Musculoskeletal:         General: No swelling or tenderness. Normal range of motion.      Cervical back: Normal range of motion and neck supple. No muscular tenderness.   Skin:     General: Skin is warm and dry.      Findings: No erythema or rash.   Neurological:      General: No focal deficit present.      Mental Status: She is alert and oriented to person, place, and time.      Cranial Nerves: No cranial nerve deficit.      Motor: No weakness.   Psychiatric:         Mood and Affect: Mood normal.         Behavior: Behavior normal.       Condition on Discharge: medically stable.    Discharge Disposition:  Home or Self Care    Discharge Medications:     Discharge Medications        Changes to Medications        Instructions Start Date   flecainide 100 MG tablet  Commonly known as: TAMBOCOR  What changed:   medication strength  how much to take   100 mg, Oral, 2 Times Daily             Continue These Medications        Instructions Start Date   Diclofenac Sodium 1 % gel gel  Commonly known as: VOLTAREN   2 g, Topical, 4 Times Daily PRN      insulin aspart 100 UNIT/ML injection  Commonly known as: novoLOG   12 Units, 3 Times Daily Before Meals      losartan 50 MG tablet  Commonly known as: COZAAR   50 mg, Oral, 2 Times Daily      metoprolol tartrate 50 MG  tablet  Commonly known as: LOPRESSOR   75 mg, 2 Times Daily      multivitamin with minerals tablet tablet   1 tablet, Oral, Daily      spironolactone 50 MG tablet  Commonly known as: ALDACTONE   50 mg, Oral, Daily      Tresiba FlexTouch 200 UNIT/ML solution pen-injector pen injection  Generic drug: Insulin Degludec   44 Units, Daily      warfarin 5 MG tablet  Commonly known as: COUMADIN   5 mg, 3 Times Weekly      warfarin 5 MG tablet  Commonly known as: COUMADIN   2.5 mg, 4 Times Weekly             Discharge Diet:   Diet Instructions       Diet: Regular/House Diet, Diabetic Diets, Cardiac Diets; Healthy Heart (2-3 Na+); Regular (IDDSI 7); Thin (IDDSI 0); Consistent Carbohydrate      Discharge Diet:  Regular/House Diet  Diabetic Diets  Cardiac Diets       Cardiac Diet: Healthy Heart (2-3 Na+)    Texture: Regular (IDDSI 7)    Fluid Consistency: Thin (IDDSI 0)    Diabetic Diet: Consistent Carbohydrate            Activity at Discharge:   Activity Instructions       Activity as Tolerated              Follow-up Appointments:   1.  Follow-up with Dr. Haro in 1 week.  2.  Follow-up with EP per recommendations.  Future Appointments   Date Time Provider Department Center   11/22/2024  9:00 AM Zoltan Lynne APRN [facility] PAD PAD   1/28/2025  8:30 AM Dee Dee Membreno PA [facility] PAD PAD       Test Results Pending at Discharge: none.    Electronically signed by ROJELIO Zhong, 10/22/24, 16:19 CDT.    Time: 35 minutes.

## 2024-10-22 NOTE — CASE MANAGEMENT/SOCIAL WORK
Discharge Planning Assessment  [facility]     Patient Name: Kiesha Nichols  MRN: [id number]  Today's Date: 10/22/2024    Admit Date: 10/21/2024        Discharge Needs Assessment       Row Name 10/22/24 1128       Living Environment    People in Home spouse    Current Living Arrangements home    Potentially Unsafe Housing Conditions none    Primary Care Provided by self    Family Caregiver if Needed spouse    Quality of Family Relationships supportive    Able to Return to Prior Arrangements yes       Transition Planning    Patient/Family Anticipates Transition to home       Discharge Needs Assessment    Equipment Currently Used at Home cane, straight;walker, rolling    Discharge Coordination/Progress Spoke with patient for dc planning. Spouse at bedside and supportive. Patient has pcp and Rx coverage. Plans to dc home and denies discharge needs.                   Discharge Plan    No documentation.                 Continued Care and Services - Admitted Since 10/21/2024    No active coordination exists for this encounter.          Demographic Summary    No documentation.                  Functional Status    No documentation.                  Psychosocial    No documentation.                  Abuse/Neglect    No documentation.                  Legal    No documentation.                  Substance Abuse    No documentation.                  Patient Forms    No documentation.                     Merlina A Fletcher, RN

## 2024-10-22 NOTE — PROGRESS NOTES
"EP Problems:  1.  Palpitations  2.  PAF  -6/7/24: Holter with afib      Cardiology Problems:  1.  Hypertension  2.  Hyperlipidemia  3.  DVT     Medical Problems:  1.  GERD  2.  Obesity  3.  CKD  4.  Type 2 diabetes  5.  Carcinoid tumor    Patient ID:  Kiesha Nichols is a 78 y.o. female with problem list as above as above who EP is following for paroxysmal atrial fibrillation.    Subjective:  Had return of sinus rhythm overnight.  Feeling better.    Objective:  /62 (BP Location: Left arm, Patient Position: Lying)   Pulse 70   Temp 97.1 °F (36.2 °C) (Oral)   Resp 16   Ht 162.6 cm (64\")   Wt 86.6 kg (191 lb)   SpO2 94%   BMI 32.79 kg/m²   Well-appearing, no acute distress  Clear to auscultation bilaterally  Regular rate and rhythm  Warm, well-perfused    Labs today:  Lab Results   Component Value Date    GLUCOSE 277 (H) 10/22/2024    CALCIUM 8.6 10/22/2024     10/22/2024    K 4.2 10/22/2024    CO2 24.0 10/22/2024    BUN 32 (H) 10/22/2024    CREATININE 1.30 (H) 10/22/2024    EGFR 41.7 (L) 10/22/2024     Lab Results   Component Value Date    MG 2.4 10/22/2024     Telemetry directly visualized independently reviewed: Return of sinus rhythm overnight.    Assessment:  Paroxysmal atrial fibrillation with acute exacerbation    Plan:  -Increase flecainide to 100 mg twice daily  -Continue home metoprolol 75 mg twice daily  -Continue warfarin for anticoagulation  -Will need follow-up INR's given subtherapeutic readings  -Okay for discharge home with EP follow-up    Part of this note may be an electronic transcription/translation of spoken language to printed text using the Dragon Dictation System.    "

## 2024-10-24 ENCOUNTER — NURSE TRIAGE (OUTPATIENT)
Dept: CALL CENTER | Facility: HOSPITAL | Age: 79
End: 2024-10-24
Payer: MEDICARE

## 2024-10-26 LAB
QT INTERVAL: 304 MS
QT INTERVAL: 428 MS
QTC INTERVAL: 437 MS
QTC INTERVAL: 452 MS

## 2024-11-14 ENCOUNTER — ANTICOAGULATION VISIT (OUTPATIENT)
Dept: CARDIOLOGY | Facility: CLINIC | Age: 79
End: 2024-11-14
Payer: MEDICARE

## 2024-11-14 DIAGNOSIS — Z79.01 CHRONIC ANTICOAGULATION: ICD-10-CM

## 2024-11-14 DIAGNOSIS — I48.0 PAROXYSMAL ATRIAL FIBRILLATION: Primary | ICD-10-CM

## 2024-11-14 LAB — INR PPP: 3.1

## 2024-11-21 ENCOUNTER — ANTICOAGULATION VISIT (OUTPATIENT)
Dept: CARDIOLOGY | Facility: CLINIC | Age: 79
End: 2024-11-21
Payer: MEDICARE

## 2024-11-21 DIAGNOSIS — Z79.01 CHRONIC ANTICOAGULATION: ICD-10-CM

## 2024-11-21 DIAGNOSIS — I48.0 PAROXYSMAL ATRIAL FIBRILLATION: Primary | ICD-10-CM

## 2024-11-21 LAB — INR PPP: 2.1

## 2024-11-22 ENCOUNTER — OFFICE VISIT (OUTPATIENT)
Dept: CARDIOLOGY | Facility: CLINIC | Age: 79
End: 2024-11-22
Payer: MEDICARE

## 2024-11-22 VITALS
SYSTOLIC BLOOD PRESSURE: 132 MMHG | HEIGHT: 64 IN | HEART RATE: 66 BPM | BODY MASS INDEX: 33.8 KG/M2 | DIASTOLIC BLOOD PRESSURE: 58 MMHG | WEIGHT: 198 LBS | OXYGEN SATURATION: 98 %

## 2024-11-22 DIAGNOSIS — Z79.01 CHRONIC ANTICOAGULATION: ICD-10-CM

## 2024-11-22 DIAGNOSIS — I48.0 PAROXYSMAL ATRIAL FIBRILLATION: Primary | ICD-10-CM

## 2024-11-22 DIAGNOSIS — I44.0 FIRST DEGREE AV BLOCK: ICD-10-CM

## 2024-11-22 NOTE — PROGRESS NOTES
[facility] Electrophysiology   Reason For Visit:  Atrial Fibrillation (2 MON FU)     Subjective        EP Problems:  1.  Palpitations  2.  PAF  -6/7/24: Holter with afib      Cardiology Problems:  1.  Hypertension  2.  Hyperlipidemia  3.  DVT     Medical Problems:  1.  GERD  2.  Obesity  3.  CKD  4.  Type 2 diabetes  5.  Carcinoid tumor          Kiesha Nichols is a 78 y.o. female with above pertinent PMH who presents for follow-up of symptomatic paroxysmal atrial fibrillation.  Patient had recent hospitalization for symptomatic atrial fibrillation rapid ventricular response.  She converted with medications in the hospital.  Flecainide was increased to 100 mg twice daily.  Since hospital discharge she has been doing well.  Has not noticed any recurrence of atrial fibrillation and she is tolerating her regimen well without any significant side effects.        ROS: Pertinent findings as noted above        Pertinent past medical, surgical, family, and social history were reviewed.      Current Outpatient Medications:     Diclofenac Sodium (VOLTAREN) 1 % gel gel, Apply 2 g topically to the appropriate area as directed 4 (Four) Times a Day As Needed (pain)., Disp: , Rfl:     flecainide (TAMBOCOR) 100 MG tablet, Take 1 tablet by mouth 2 (Two) Times a Day., Disp: 60 tablet, Rfl: 11    insulin aspart (novoLOG) 100 UNIT/ML injection, Inject 12 Units under the skin into the appropriate area as directed 3 (Three) Times a Day Before Meals. Sliding Scale: Max of 50 units daily, Disp: , Rfl:     Insulin Degludec (Tresiba FlexTouch) 200 UNIT/ML solution pen-injector pen injection, Inject 44 Units under the skin into the appropriate area as directed Daily., Disp: , Rfl:     losartan (COZAAR) 50 MG tablet, Take 1 tablet by mouth 2 (Two) Times a Day., Disp: , Rfl:     metoprolol tartrate (LOPRESSOR) 50 MG tablet, Take 1.5 tablets by mouth 2 (Two) Times a Day., Disp: , Rfl:     multivitamin with minerals tablet tablet, Take 1  "tablet by mouth Daily., Disp: , Rfl:     spironolactone (ALDACTONE) 50 MG tablet, Take 1 tablet by mouth Daily., Disp: , Rfl:     warfarin (COUMADIN) 5 MG tablet, Take 1 tablet by mouth 3 (Three) Times a Week. M, W,F, Disp: , Rfl:     warfarin (COUMADIN) 5 MG tablet, Take 0.5 tablets by mouth 4 (Four) Times a Week., Disp: , Rfl:      Objective   Vital Signs:  /58   Pulse 66   Ht 162.6 cm (64.02\")   Wt 89.8 kg (198 lb)   SpO2 98%   BMI 33.97 kg/m²   Estimated body mass index is 33.97 kg/m² as calculated from the following:    Height as of this encounter: 162.6 cm (64.02\").    Weight as of this encounter: 89.8 kg (198 lb).      Constitutional:       Appearance: Healthy appearance. Not in distress.   Pulmonary:      Effort: Pulmonary effort is normal.      Breath sounds: Normal breath sounds.   Cardiovascular:      PMI at left midclavicular line. Normal rate. Regular rhythm.      Murmurs: There is no murmur.      No gallop.  No click. No rub.   Edema:     Peripheral edema absent.   Abdominal:      General: Bowel sounds are normal.   Musculoskeletal: Normal range of motion.      Cervical back: Normal range of motion and neck supple. Skin:     General: Skin is warm.   Neurological:      Mental Status: Alert and oriented to person, place and time.        Result Review :  The following data was reviewed by: ROJELIO Lockwood on 11/22/2024:  CMP   CMP          9/27/2024    13:36 10/21/2024    11:13 10/22/2024    02:56   CMP   Glucose 195  111  277    BUN 28  23  32    Creatinine 1.32  0.97  1.30    EGFR 40.9  59.2  41.7    Sodium 144  139  137    Potassium 4.4  4.0  4.2    Chloride 107  104  103    Calcium 9.1  9.2  8.6    BUN/Creatinine Ratio 21.2  23.7  24.6    Anion Gap 10.0  10.0  10.0      CBC   CBC          10/21/2024    11:13   CBC   WBC 13.77    RBC 4.10    Hemoglobin 12.2    Hematocrit 38.1    MCV 92.9    MCH 29.8    MCHC 32.0    RDW 12.3    Platelets 234      Lipid Panel   BMP   BMP          " 9/27/2024    13:36 10/21/2024    11:13 10/22/2024    02:56   BMP   BUN 28  23  32    Creatinine 1.32  0.97  1.30    Sodium 144  139  137    Potassium 4.4  4.0  4.2    Chloride 107  104  103    CO2 27.0  25.0  24.0    Calcium 9.1  9.2  8.6      Data reviewed : Cardiology studies echo    Results for orders placed during the hospital encounter of 01/09/24    Adult Transthoracic Echo Complete W/ Cont if Necessary Per Protocol    Interpretation Summary    Left ventricular systolic function is normal. Left ventricular ejection fraction appears to be 56 - 60%.    Left ventricular diastolic dysfunction is noted.    Normal right ventricular cavity size and systolic function noted.    Mild mitral annular calcification is present. Mild mitral valve regurgitation is present.    No evidence of pulmonary hypertension is present.      ECG 12 Lead    Date/Time: 11/22/2024 8:57 AM  Performed by: Zoltan Lynne APRN    Authorized by: Zoltan Lynne APRN  Comparison: compared with previous ECG from 10/21/2024  Comparison to previous ECG: Normal sinus rhythm; new first-degree AV block  Rhythm: sinus rhythm  Rate: normal  Conduction: 1st degree AV block  QRS axis: normal    Clinical impression: abnormal EKG            Assessment and Plan   Diagnoses and all orders for this visit:    1. Paroxysmal atrial fibrillation (Primary)  2. Chronic anticoagulation  3. First degree AV block  -Normal sinus rhythm on EKG with new first-degree AV block  - Continue flecainide 100 mg twice daily  - Continue Lopressor 75 mg twice daily  - Anticoagulation per Coumadin clinic  - Will plan to follow-up with the patient in the spring.             I spent 2 minutes on the separately reported service of EKG interpretation. This time is not included in the time used to support the E/M service also reported today.      Follow Up   Return in about 4 months (around 3/22/2025).  Patient was given instructions and counseling regarding her condition or for  health maintenance advice. Please see specific information pulled into the AVS if appropriate.       Part of this note may be an electronic transcription/translation of spoken language to printed text using the Dragon Dictation System.

## 2024-12-05 ENCOUNTER — ANTICOAGULATION VISIT (OUTPATIENT)
Dept: CARDIOLOGY | Facility: CLINIC | Age: 79
End: 2024-12-05
Payer: MEDICARE

## 2024-12-05 DIAGNOSIS — Z79.01 CHRONIC ANTICOAGULATION: ICD-10-CM

## 2024-12-05 DIAGNOSIS — I48.0 PAROXYSMAL ATRIAL FIBRILLATION: Primary | ICD-10-CM

## 2024-12-05 LAB — INR PPP: 2.7

## 2025-01-02 ENCOUNTER — ANTICOAGULATION VISIT (OUTPATIENT)
Dept: CARDIOLOGY | Facility: CLINIC | Age: 80
End: 2025-01-02
Payer: MEDICARE

## 2025-01-02 DIAGNOSIS — I48.0 PAROXYSMAL ATRIAL FIBRILLATION: Primary | ICD-10-CM

## 2025-01-02 DIAGNOSIS — Z79.01 CHRONIC ANTICOAGULATION: ICD-10-CM

## 2025-01-02 LAB — INR PPP: 2.5

## 2025-02-06 ENCOUNTER — ANTICOAGULATION VISIT (OUTPATIENT)
Dept: CARDIOLOGY | Facility: CLINIC | Age: 80
End: 2025-02-06

## 2025-02-06 DIAGNOSIS — I48.0 PAROXYSMAL ATRIAL FIBRILLATION: Primary | ICD-10-CM

## 2025-02-06 DIAGNOSIS — Z79.01 CHRONIC ANTICOAGULATION: ICD-10-CM

## 2025-02-06 LAB — INR PPP: 2.3

## 2025-02-07 ENCOUNTER — TRANSCRIBE ORDERS (OUTPATIENT)
Dept: ADMINISTRATIVE | Facility: HOSPITAL | Age: 80
End: 2025-02-07
Payer: MEDICARE

## 2025-02-07 DIAGNOSIS — E04.1 NONTOXIC SINGLE THYROID NODULE: Primary | ICD-10-CM

## 2025-03-05 ENCOUNTER — HOSPITAL ENCOUNTER (OUTPATIENT)
Dept: ULTRASOUND IMAGING | Facility: HOSPITAL | Age: 80
Discharge: HOME | End: 2025-03-05
Admitting: PHYSICIAN ASSISTANT
Payer: MEDICARE

## 2025-03-05 DIAGNOSIS — E04.1 NONTOXIC SINGLE THYROID NODULE: ICD-10-CM

## 2025-03-05 PROCEDURE — 76536 US EXAM OF HEAD AND NECK: CPT

## 2025-03-10 ENCOUNTER — TRANSCRIBE ORDERS (OUTPATIENT)
Dept: ADMINISTRATIVE | Facility: HOSPITAL | Age: 80
End: 2025-03-10
Payer: MEDICARE

## 2025-03-10 DIAGNOSIS — E04.1 NONTOXIC SINGLE THYROID NODULE: Primary | ICD-10-CM

## 2025-03-16 ENCOUNTER — NURSE TRIAGE (OUTPATIENT)
Dept: CALL CENTER | Facility: HOSPITAL | Age: 80
End: 2025-03-16
Payer: MEDICARE

## 2025-03-18 ENCOUNTER — TELEPHONE (OUTPATIENT)
Dept: INTERVENTIONAL RADIOLOGY/VASCULAR | Facility: HOSPITAL | Age: 80
End: 2025-03-18
Payer: MEDICARE

## 2025-03-18 NOTE — TELEPHONE ENCOUNTER
Called patient and spoke with patient's spouse, Ameya, to confirm appointment and went over instructions for the procedure.  Patient's spouse confirmed and voiced understanding.

## 2025-03-19 ENCOUNTER — HOSPITAL ENCOUNTER (OUTPATIENT)
Dept: ULTRASOUND IMAGING | Facility: HOSPITAL | Age: 80
Discharge: HOME | End: 2025-03-19
Payer: MEDICARE

## 2025-03-19 DIAGNOSIS — E04.1 NONTOXIC SINGLE THYROID NODULE: ICD-10-CM

## 2025-03-19 PROCEDURE — 76942 ECHO GUIDE FOR BIOPSY: CPT

## 2025-03-19 PROCEDURE — 88112 CYTOPATH CELL ENHANCE TECH: CPT | Performed by: PHYSICIAN ASSISTANT

## 2025-03-19 PROCEDURE — 88172 CYTP DX EVAL FNA 1ST EA SITE: CPT | Performed by: PHYSICIAN ASSISTANT

## 2025-03-19 RX ORDER — LIDOCAINE HYDROCHLORIDE 10 MG/ML
5 INJECTION, SOLUTION INFILTRATION; PERINEURAL ONCE
Status: DISPENSED | OUTPATIENT
Start: 2025-03-19

## 2025-03-20 LAB
BEAKER LAB AP INTRAOPERATIVE CONSULTATION: NORMAL
CYTO UR: NORMAL
LAB AP CASE REPORT: NORMAL
LAB AP DIAGNOSIS COMMENT: NORMAL
Lab: NORMAL
PATH REPORT.FINAL DX SPEC: NORMAL
PATH REPORT.GROSS SPEC: NORMAL

## 2025-03-28 ENCOUNTER — ANTICOAGULATION VISIT (OUTPATIENT)
Dept: CARDIOLOGY | Facility: CLINIC | Age: 80
End: 2025-03-28
Payer: MEDICARE

## 2025-03-28 ENCOUNTER — OFFICE VISIT (OUTPATIENT)
Dept: CARDIOLOGY | Facility: CLINIC | Age: 80
End: 2025-03-28
Payer: MEDICARE

## 2025-03-28 VITALS
BODY MASS INDEX: 33.8 KG/M2 | HEART RATE: 60 BPM | DIASTOLIC BLOOD PRESSURE: 68 MMHG | WEIGHT: 198 LBS | OXYGEN SATURATION: 97 % | HEIGHT: 64 IN | SYSTOLIC BLOOD PRESSURE: 128 MMHG

## 2025-03-28 DIAGNOSIS — I48.0 PAROXYSMAL ATRIAL FIBRILLATION: Primary | ICD-10-CM

## 2025-03-28 DIAGNOSIS — I44.0 FIRST DEGREE AV BLOCK: ICD-10-CM

## 2025-03-28 DIAGNOSIS — Z79.01 CHRONIC ANTICOAGULATION: ICD-10-CM

## 2025-03-28 LAB — INR PPP: 1.6

## 2025-03-28 NOTE — PROGRESS NOTES
[facility] Electrophysiology   Reason For Visit:  Atrial Fibrillation     Subjective        EP Problems:  1.  Palpitations  2.  PAF  -6/7/24: Holter with afib      Cardiology Problems:  1.  Hypertension  2.  Hyperlipidemia  3.  DVT     Medical Problems:  1.  GERD  2.  Obesity  3.  CKD  4.  Type 2 diabetes  5.  Carcinoid tumor        Kiesha Nichols is a 79 y.o. female with above pertinent PMH who presents for follow-up of PAF.    Last seen in November 2024.  Was placed on increased flecainide prior to that due to symptomatic A-fib.  She was in normal sinus at that appointment, therefore no adjustments were made    Since her previous appointment she has been doing well.  She denies any cardiac symptoms.  She is tolerating her metoprolol flecainide well no significant side effects.  She has not noticed any significant palpitations or fluttering.  No bleeding concerns on her Coumadin.      ROS: Pertinent findings as noted above        Pertinent past medical, surgical, family, and social history were reviewed.      Current Outpatient Medications:     Diclofenac Sodium (VOLTAREN) 1 % gel gel, Apply 2 g topically to the appropriate area as directed 4 (Four) Times a Day As Needed (pain)., Disp: , Rfl:     flecainide (TAMBOCOR) 100 MG tablet, Take 1 tablet by mouth 2 (Two) Times a Day., Disp: 60 tablet, Rfl: 11    insulin aspart (novoLOG) 100 UNIT/ML injection, Inject 12 Units under the skin into the appropriate area as directed 3 (Three) Times a Day Before Meals. Sliding Scale: Max of 50 units daily, Disp: , Rfl:     Insulin Degludec (Tresiba FlexTouch) 200 UNIT/ML solution pen-injector pen injection, Inject 44 Units under the skin into the appropriate area as directed Daily., Disp: , Rfl:     losartan (COZAAR) 50 MG tablet, Take 1 tablet by mouth 2 (Two) Times a Day., Disp: , Rfl:     metoprolol tartrate (LOPRESSOR) 50 MG tablet, Take 1.5 tablets by mouth 2 (Two) Times a Day., Disp: , Rfl:     spironolactone  "(ALDACTONE) 50 MG tablet, Take 1 tablet by mouth Daily., Disp: , Rfl:     warfarin (COUMADIN) 5 MG tablet, Take 1 tablet by mouth 3 (Three) Times a Week. M, W,F, Disp: , Rfl:     Current Facility-Administered Medications:     lidocaine (XYLOCAINE) 1 % injection 5 mL, 5 mL, Infiltration, Once, Raghu Hamilton MD     Objective   Vital Signs:  /68   Pulse 60   Ht 162.6 cm (64.02\")   Wt 89.8 kg (198 lb)   SpO2 97%   BMI 33.97 kg/m²   Estimated body mass index is 33.97 kg/m² as calculated from the following:    Height as of this encounter: 162.6 cm (64.02\").    Weight as of this encounter: 89.8 kg (198 lb).      Constitutional:       Appearance: Healthy appearance. Not in distress.   Pulmonary:      Effort: Pulmonary effort is normal.      Breath sounds: Normal breath sounds.   Cardiovascular:      PMI at left midclavicular line. Normal rate. Regular rhythm. Normal S1. Normal S2.       Murmurs: There is no murmur.      No gallop.  No click. No rub.   Pulses:     Intact distal pulses.   Edema:     Peripheral edema absent.   Neurological:      Mental Status: Alert and oriented to person, place and time.        Result Review :  The following data was reviewed by: ROJELIO Lockwood on 03/28/2025:  CMP   CMP          9/27/2024    13:36 10/21/2024    11:13 10/22/2024    02:56   CMP   Glucose 195  111  277    BUN 28  23  32    Creatinine 1.32  0.97  1.30    EGFR 40.9  59.2  41.7    Sodium 144  139  137    Potassium 4.4  4.0  4.2    Chloride 107  104  103    Calcium 9.1  9.2  8.6    BUN/Creatinine Ratio 21.2  23.7  24.6    Anion Gap 10.0  10.0  10.0      CBC   CBC          10/21/2024    11:13   CBC   WBC 13.77    RBC 4.10    Hemoglobin 12.2    Hematocrit 38.1    MCV 92.9    MCH 29.8    MCHC 32.0    RDW 12.3    Platelets 234           ECG 12 Lead    Date/Time: 3/28/2025 8:35 AM  Performed by: Zoltan Lynne APRN    Authorized by: Zoltan Lynne APRN  Comparison: compared with previous ECG from " 11/22/2024  Similar to previous ECG  Comparison to previous ECG: Normal sinus rhythm first-degree AV block  Rhythm: sinus rhythm  Rate: normal  Conduction: 1st degree AV block  QRS axis: normal    Clinical impression: abnormal EKG            Assessment and Plan   Diagnoses and all orders for this visit:    1. Paroxysmal atrial fibrillation (Primary)  2. Chronic anticoagulation  3. First degree AV block  -In sinus rhythm on EKG today  - Continue Lopressor 75 mg twice daily as well as flecainide 100 mg twice daily  - Continue management of Coumadin per Coumadin clinic  - Continue losartan 50 mg twice daily  - Follow-up in EP clinic in 6 months or sooner if symptoms arise                   I spent 2 minutes on the separately reported service of EKG interpretation. This time is not included in the time used to support the E/M service also reported today.      Follow Up   Return in about 6 months (around 9/28/2025).  Patient was given instructions and counseling regarding her condition or for health maintenance advice. Please see specific information pulled into the AVS if appropriate.       Part of this note may be an electronic transcription/translation of spoken language to printed text using the Dragon Dictation System.

## 2025-04-02 ENCOUNTER — TELEPHONE (OUTPATIENT)
Dept: INTERVENTIONAL RADIOLOGY/VASCULAR | Facility: HOSPITAL | Age: 80
End: 2025-04-02
Payer: MEDICARE

## 2025-04-02 NOTE — TELEPHONE ENCOUNTER
I called to touch base with patient after biopsy, patient was unavailable but her , Ameya, was able to answer questions. No complaints or complications after procedure.

## 2025-04-04 ENCOUNTER — ANTICOAGULATION VISIT (OUTPATIENT)
Dept: CARDIOLOGY | Facility: CLINIC | Age: 80
End: 2025-04-04
Payer: MEDICARE

## 2025-04-04 DIAGNOSIS — Z79.01 CHRONIC ANTICOAGULATION: ICD-10-CM

## 2025-04-04 DIAGNOSIS — I48.0 PAROXYSMAL ATRIAL FIBRILLATION: Primary | ICD-10-CM

## 2025-04-04 LAB — INR PPP: 2.1

## 2025-05-02 ENCOUNTER — ANTICOAGULATION VISIT (OUTPATIENT)
Dept: CARDIOLOGY | Facility: CLINIC | Age: 80
End: 2025-05-02
Payer: MEDICARE

## 2025-05-02 DIAGNOSIS — I48.0 PAROXYSMAL ATRIAL FIBRILLATION: Primary | ICD-10-CM

## 2025-05-02 DIAGNOSIS — Z79.01 CHRONIC ANTICOAGULATION: ICD-10-CM

## 2025-05-21 ENCOUNTER — OFFICE VISIT (OUTPATIENT)
Dept: OTOLARYNGOLOGY | Facility: CLINIC | Age: 80
End: 2025-05-21
Payer: MEDICARE

## 2025-05-21 VITALS
TEMPERATURE: 98.4 F | RESPIRATION RATE: 20 BRPM | HEIGHT: 64 IN | HEART RATE: 56 BPM | BODY MASS INDEX: 33.8 KG/M2 | SYSTOLIC BLOOD PRESSURE: 196 MMHG | WEIGHT: 198 LBS | DIASTOLIC BLOOD PRESSURE: 76 MMHG

## 2025-05-21 DIAGNOSIS — E04.1 THYROID NODULE: Primary | ICD-10-CM

## 2025-05-21 DIAGNOSIS — J30.0 CHRONIC VASOMOTOR RHINITIS: ICD-10-CM

## 2025-05-21 RX ORDER — FLUOCINONIDE TOPICAL SOLUTION USP, 0.05% 0.5 MG/ML
0.05 SOLUTION TOPICAL DAILY
COMMUNITY

## 2025-05-21 RX ORDER — ALENDRONATE SODIUM 70 MG/1
70 TABLET ORAL WEEKLY
COMMUNITY

## 2025-05-21 RX ORDER — FLUTICASONE PROPIONATE 50 MCG
2 SPRAY, SUSPENSION (ML) NASAL DAILY
Qty: 16 G | Refills: 6 | Status: SHIPPED | OUTPATIENT
Start: 2025-05-21

## 2025-05-21 NOTE — PROGRESS NOTES
Bubba Ward MD  [facility]  [address], [address]  [place] [address]  Fax [phone]  Phone [phone]      Visit Type: NEW PATIENT   Chief Complaint   Patient presents with    Thyroid Problem     Abnormal thyroid biopsy           History of Present Illness  The patient presents for evaluation of a thyroid nodule and is accompanied by her .    She reports receiving a letter approximately 2 weeks ago, following her discharge from the hospital 3 to 4 years prior, specifically in October 2021. She was subsequently hospitalized due to a fall on ice in January 2025. During her hospital stay, she underwent radiographic imaging, laboratory tests, and a biopsy. She received another letter recently, prompting her to seek further evaluation. She has been under the care of Dr. Pena, who performed a biopsy in 2022, which was negative for malignancy.    In January 2025, a computed tomography (CT) scan of the chest revealed thyroid nodules. An ultrasound performed on February 7, 2025, showed a 3 cm heterogeneous nodule on the left side. A fine needle aspiration biopsy conducted on March 19, 2025, indicated a Sesser category three reading, which was further analyzed through molecular studies, showing approximately a 1% risk of malignancy.    She also reports experiencing significant nasal discharge, particularly in the mornings, and notes that she experiences rhinorrhea postprandially. She is currently on spironolactone.    PAST SURGICAL HISTORY:  Negative needle biopsy on the left side in 2022.         History     Last Reviewed by Bubba Ward MD on 5/21/2025 at 10:14 AM    Sections Reviewed    Tobacco, Family, Medical, Surgical    Problem list reviewed by Bubab Ward MD on 5/21/2025 at 10:14 AM  Medicines reviewed by Bubba Ward MD on 5/21/2025 at 10:14 AM  Allergies reviewed by Bubba Ward  MD [place] on 5/21/2025 at 10:14 AM          Vital Signs:   Temp:  [98.4 °F (36.9 °C)] 98.4 °F (36.9 °C)  Heart Rate:  [56] 56  Resp:  [20] 20  BP: (196)/(76) 196/76  ENT Physical Exam   Physical Exam  Appearance: Patient appears well-developed and well-nourished  Communication/Voice: Voice sounds normal  Face: Head appears normal, face appears normal and face appears atraumatic  Eyes: Normal periorbita  Hearing: Intact  Auricles: Right auricle normal; left auricle normal  External Mastoids: Right external mastoid normal; left external mastoid normal  External Nose: Nares patent bilaterally; external nose normal  Internal nose: There is bilateral nasal congestion and erythema  Lips: Normal  Neck: No palpable thyroid nodule, no lymphadenopathy or masses detected  Respiratory: Breathing unlabored; normal breathing rate  Cardiovascular: Extremities are warm and well perfused; no peripheral edema present  Mental Status: Alert and oriented  Psychiatric: Mood normal; affect is appropriate  Skin: No skin lesions or rashes           Result Review       RESULTS REVIEW    Results  - Imaging:    - CT scan of the chest (01/2025): Revealed thyroid nodules    - Ultrasound of the left side (02/07/2025): Showed a 3 cm heterogeneous nodule with fluidy areas and solid areas, indicating a benign finding    - Diagnostic Testing:    - Needle biopsy of the left side (2022): Negative, Darien two (benign thyroid lesion)    - Needle biopsy (03/19/2025): Darien category three reading, indeterminate. Molecular studies (THYGENEXT) showed about a 1% risk of malignancy             Assessment & Plan  Thyroid nodule         Chronic vasomotor rhinitis              1. Thyroid nodule.  History of thyroid nodule with previous biopsies showing benign results. A recent ultrasound on 02/07/2025 revealed a 3 cm heterogeneous nodule on the left side. A needle biopsy performed on 03/19/2025 showed a Darien category three reading, which is  indeterminate. Molecular studies (THYGENEXT) indicated about a 1% risk of malignancy. Given the benign nature of the findings and the patient's age, surgical removal of the thyroid is not recommended. Referral back to primary care physician for ongoing monitoring.    2. Vasomotor rhinitis.  Reports nasal drainage, especially in the mornings and after eating. This is consistent with vasomotor rhinitis. Prescription for Flonase nasal spray provided, to be used once daily. If adverse effects from Flonase occur, discontinue use.    Follow-up as needed.             Patient or patient representative verbalized consent for the use of Ambient Listening during the visit with  Bubba Ward MD for chart documentation. 5/21/2025  10:15 CDT  Bubba Ward MD

## 2025-05-29 DIAGNOSIS — I48.0 PAROXYSMAL ATRIAL FIBRILLATION: Primary | ICD-10-CM

## 2025-05-29 DIAGNOSIS — Z79.01 CHRONIC ANTICOAGULATION: ICD-10-CM

## 2025-06-02 RX ORDER — WARFARIN SODIUM 5 MG/1
TABLET ORAL
Qty: 90 TABLET | Refills: 3 | Status: SHIPPED | OUTPATIENT
Start: 2025-06-02

## 2025-06-06 ENCOUNTER — ANTICOAGULATION VISIT (OUTPATIENT)
Dept: CARDIOLOGY | Facility: CLINIC | Age: 80
End: 2025-06-06
Payer: MEDICARE

## 2025-06-06 DIAGNOSIS — Z79.01 CHRONIC ANTICOAGULATION: ICD-10-CM

## 2025-06-06 DIAGNOSIS — I48.0 PAROXYSMAL ATRIAL FIBRILLATION: Primary | ICD-10-CM

## 2025-06-06 LAB — INR PPP: 1.6

## 2025-06-20 ENCOUNTER — ANTICOAGULATION VISIT (OUTPATIENT)
Dept: CARDIOLOGY | Facility: CLINIC | Age: 80
End: 2025-06-20
Payer: MEDICARE

## 2025-06-20 DIAGNOSIS — Z79.01 CHRONIC ANTICOAGULATION: ICD-10-CM

## 2025-06-20 DIAGNOSIS — I48.0 PAROXYSMAL ATRIAL FIBRILLATION: Primary | ICD-10-CM

## 2025-06-20 LAB — INR PPP: 2

## 2025-07-07 ENCOUNTER — ANTICOAGULATION VISIT (OUTPATIENT)
Dept: CARDIOLOGY | Facility: CLINIC | Age: 80
End: 2025-07-07
Payer: MEDICARE

## 2025-07-07 DIAGNOSIS — Z79.01 CHRONIC ANTICOAGULATION: ICD-10-CM

## 2025-07-07 DIAGNOSIS — I48.0 PAROXYSMAL ATRIAL FIBRILLATION: Primary | ICD-10-CM

## 2025-07-07 LAB — INR PPP: 3.3

## 2025-07-21 ENCOUNTER — ANTICOAGULATION VISIT (OUTPATIENT)
Dept: CARDIOLOGY | Facility: CLINIC | Age: 80
End: 2025-07-21
Payer: MEDICARE

## 2025-07-21 DIAGNOSIS — I48.0 PAROXYSMAL ATRIAL FIBRILLATION: Primary | ICD-10-CM

## 2025-07-21 DIAGNOSIS — Z79.01 CHRONIC ANTICOAGULATION: ICD-10-CM

## 2025-07-21 LAB — INR PPP: 2.6
